# Patient Record
Sex: MALE | Race: WHITE | NOT HISPANIC OR LATINO | Employment: FULL TIME | URBAN - METROPOLITAN AREA
[De-identification: names, ages, dates, MRNs, and addresses within clinical notes are randomized per-mention and may not be internally consistent; named-entity substitution may affect disease eponyms.]

---

## 2022-11-17 ENCOUNTER — HOSPITAL ENCOUNTER (EMERGENCY)
Facility: HOSPITAL | Age: 42
Discharge: HOME | End: 2022-11-17
Attending: EMERGENCY MEDICINE | Admitting: EMERGENCY MEDICINE

## 2022-11-17 VITALS
RESPIRATION RATE: 18 BRPM | HEIGHT: 73 IN | OXYGEN SATURATION: 96 % | BODY MASS INDEX: 27.17 KG/M2 | DIASTOLIC BLOOD PRESSURE: 95 MMHG | WEIGHT: 205 LBS | TEMPERATURE: 98.1 F | HEART RATE: 84 BPM | SYSTOLIC BLOOD PRESSURE: 130 MMHG

## 2022-11-17 DIAGNOSIS — N36.8 IRRITATION OF URETHRA: Primary | ICD-10-CM

## 2022-11-17 LAB
BILIRUB UR QL STRIP: NEGATIVE
CLARITY UR: CLEAR
COLOR UR: YELLOW
GLUCOSE UR STRIP-MCNC: NEGATIVE MG/DL
HGB UR QL STRIP.AUTO: NEGATIVE
KETONES UR QL STRIP: NEGATIVE
LEUKOCYTE ESTERASE UR QL STRIP.AUTO: NEGATIVE
NITRITE UR QL STRIP: NEGATIVE
PH UR STRIP.AUTO: 6.5 [PH] (ref 4.5–8)
PROT UR QL STRIP: NEGATIVE
SP GR UR STRIP: 1.01 (ref 1–1.03)
UROBILINOGEN UR QL STRIP: NORMAL

## 2022-11-17 PROCEDURE — 81003 URINALYSIS AUTO W/O SCOPE: CPT | Performed by: EMERGENCY MEDICINE

## 2022-11-17 PROCEDURE — 99283 EMERGENCY DEPT VISIT LOW MDM: CPT

## 2022-11-17 RX ORDER — ONDANSETRON 4 MG/1
4 TABLET, ORALLY DISINTEGRATING ORAL EVERY 8 HOURS PRN
Qty: 21 TABLET | Refills: 0 | Status: SHIPPED | OUTPATIENT
Start: 2022-11-17

## 2022-11-17 RX ORDER — LEVOTHYROXINE SODIUM 0.05 MG/1
50 TABLET ORAL DAILY
COMMUNITY

## 2022-11-17 NOTE — ED PROVIDER NOTES
Subjective   History of Present Illness  41-year-old male presents with urethral irritation and burning.  Patient states his symptoms started 6 days ago and have been intermittent since that time.  Describes it as a burning sensation at his urethral meatus.  Denies trauma.  Denies bleeding or drainage.  Pain is not made worse with urination.  No urinary frequency or urgency.  Normal bowel movements.  No pain with bowel movements.  No pain elsewhere.  Patient has had occasional waves of nausea but no vomiting and has been eating and drinking normally.  Patient went to [place] emergency department for this complaint 4 days ago.  Reports that he had blood work, urinalysis, CT scan, and the only abnormality that was found was some bacteria in his urine.  He was prescribed 10-day course of Cipro and is on day 5 of this.  He took Pyridium for 2 days as well.  Patient denies concern for STI and states he is only sexually active with his wife.  He does not have any lesions to his genitalia.  Other than the antibiotic he has not taken any medications for his symptoms the past couple of days.  Denies weak stream or frequent urination at night.        Review of Systems   All other systems reviewed and are negative.      Past Medical History:   Diagnosis Date   • Disease of thyroid gland    • History of IBS        No Known Allergies    History reviewed. No pertinent surgical history.    History reviewed. No pertinent family history.    Social History     Socioeconomic History   • Marital status: Single   Tobacco Use   • Smoking status: Never   Substance and Sexual Activity   • Alcohol use: Yes     Comment: Socially   • Drug use: Never           Objective   Physical Exam  Constitutional:       General: He is not in acute distress.     Appearance: He is not toxic-appearing.   HENT:      Head: Normocephalic and atraumatic.   Eyes:      Extraocular Movements: Extraocular movements intact.      Pupils: Pupils are equal, round,  and reactive to light.   Cardiovascular:      Rate and Rhythm: Normal rate and regular rhythm.   Pulmonary:      Effort: Pulmonary effort is normal. No respiratory distress.   Musculoskeletal:         General: Normal range of motion.   Skin:     General: Skin is warm and dry.   Neurological:      General: No focal deficit present.      Mental Status: He is alert and oriented to person, place, and time. Mental status is at baseline.   Psychiatric:         Mood and Affect: Mood normal.         Behavior: Behavior normal.         Thought Content: Thought content normal.         Judgment: Judgment normal.         Procedures           ED Course  ED Course as of 11/17/22 0707   Thu Nov 17, 2022   0706 Patient overall nontoxic-appearing.  Vital signs are stable.  Urinalysis here is clean.  Ultimately I do not think further work-up is indicated here in the emergency department this morning.  I encourage patient to call primary care to see if they could access Man's urine culture results.  Also encouraged patient to finish his course of antibiotics and follow-up with urology.  Will prescribe short course of Zofran for patient's nausea although it sounds like he has history of IBS and this may not be necessarily related to his current complaint. [TD]      ED Course User Index  [TD] Ray Brar MD                                           [facility]    Final diagnoses:   Irritation of urethra       ED Disposition  ED Disposition     ED Disposition   Discharge    Condition   Stable    Comment   --             Alden Amin MD  [address] [facility] [address] [phone]    Call today      [facility]  [address] [facility] [id number] [phone]  Schedule an appointment as soon as possible for a visit in 1 week           Medication List      New Prescriptions    ondansetron ODT 4 MG disintegrating tablet  Commonly known as: ZOFRAN-ODT  Place 1 tablet on the tongue Every 8 (Eight) Hours  As Needed for Nausea or Vomiting.           Where to Get Your Medications      These medications were sent to [facility] [id number] - [place], [place] - [address]  & [facility] - [phone] [facility] [phone]   [address] [facility] [address]    Phone: [phone]   · ondansetron ODT 4 MG disintegrating tablet          Ray Brar MD  11/17/22 0707

## 2023-05-15 ENCOUNTER — APPOINTMENT (OUTPATIENT)
Dept: CARDIOLOGY | Facility: HOSPITAL | Age: 43
End: 2023-05-15
Payer: COMMERCIAL

## 2023-05-15 ENCOUNTER — APPOINTMENT (OUTPATIENT)
Dept: GENERAL RADIOLOGY | Facility: HOSPITAL | Age: 43
End: 2023-05-15
Payer: COMMERCIAL

## 2023-05-15 ENCOUNTER — HOSPITAL ENCOUNTER (EMERGENCY)
Facility: HOSPITAL | Age: 43
Discharge: HOME | End: 2023-05-15
Attending: EMERGENCY MEDICINE | Admitting: EMERGENCY MEDICINE
Payer: COMMERCIAL

## 2023-05-15 VITALS
HEIGHT: 72 IN | TEMPERATURE: 97.9 F | WEIGHT: 225 LBS | HEART RATE: 62 BPM | DIASTOLIC BLOOD PRESSURE: 86 MMHG | SYSTOLIC BLOOD PRESSURE: 147 MMHG | BODY MASS INDEX: 30.48 KG/M2 | OXYGEN SATURATION: 99 % | RESPIRATION RATE: 18 BRPM

## 2023-05-15 DIAGNOSIS — R00.2 HEART PALPITATIONS: Primary | ICD-10-CM

## 2023-05-15 DIAGNOSIS — I49.3 PVC'S (PREMATURE VENTRICULAR CONTRACTIONS): ICD-10-CM

## 2023-05-15 LAB
ALBUMIN SERPL-MCNC: 4.6 G/DL (ref 3.5–5.2)
ALBUMIN/GLOB SERPL: 1.5 G/DL
ALP SERPL-CCNC: 44 U/L (ref 39–117)
ALT SERPL W P-5'-P-CCNC: 29 U/L (ref 1–41)
ANION GAP SERPL CALCULATED.3IONS-SCNC: 9.2 MMOL/L (ref 5–15)
AST SERPL-CCNC: 23 U/L (ref 1–40)
BASOPHILS # BLD AUTO: 0.05 10*3/MM3 (ref 0–0.2)
BASOPHILS NFR BLD AUTO: 0.9 % (ref 0–1.5)
BILIRUB SERPL-MCNC: 0.7 MG/DL (ref 0–1.2)
BUN SERPL-MCNC: 18 MG/DL (ref 6–20)
BUN/CREAT SERPL: 18.9 (ref 7–25)
CALCIUM SPEC-SCNC: 9.5 MG/DL (ref 8.6–10.5)
CHLORIDE SERPL-SCNC: 106 MMOL/L (ref 98–107)
CO2 SERPL-SCNC: 23.8 MMOL/L (ref 22–29)
CREAT SERPL-MCNC: 0.95 MG/DL (ref 0.76–1.27)
DEPRECATED RDW RBC AUTO: 44 FL (ref 37–54)
EGFRCR SERPLBLD CKD-EPI 2021: 102.5 ML/MIN/1.73
EOSINOPHIL # BLD AUTO: 0.04 10*3/MM3 (ref 0–0.4)
EOSINOPHIL NFR BLD AUTO: 0.7 % (ref 0.3–6.2)
ERYTHROCYTE [DISTWIDTH] IN BLOOD BY AUTOMATED COUNT: 13.1 % (ref 12.3–15.4)
GLOBULIN UR ELPH-MCNC: 3 GM/DL
GLUCOSE SERPL-MCNC: 95 MG/DL (ref 65–99)
HCT VFR BLD AUTO: 44.1 % (ref 37.5–51)
HGB BLD-MCNC: 14.9 G/DL (ref 13–17.7)
HOLD SPECIMEN: NORMAL
HOLD SPECIMEN: NORMAL
IMM GRANULOCYTES # BLD AUTO: 0.01 10*3/MM3 (ref 0–0.05)
IMM GRANULOCYTES NFR BLD AUTO: 0.2 % (ref 0–0.5)
LIPASE SERPL-CCNC: 19 U/L (ref 13–60)
LYMPHOCYTES # BLD AUTO: 1.93 10*3/MM3 (ref 0.7–3.1)
LYMPHOCYTES NFR BLD AUTO: 35.4 % (ref 19.6–45.3)
MAGNESIUM SERPL-MCNC: 1.8 MG/DL (ref 1.6–2.6)
MCH RBC QN AUTO: 31.2 PG (ref 26.6–33)
MCHC RBC AUTO-ENTMCNC: 33.8 G/DL (ref 31.5–35.7)
MCV RBC AUTO: 92.3 FL (ref 79–97)
MONOCYTES # BLD AUTO: 0.47 10*3/MM3 (ref 0.1–0.9)
MONOCYTES NFR BLD AUTO: 8.6 % (ref 5–12)
NEUTROPHILS NFR BLD AUTO: 2.95 10*3/MM3 (ref 1.7–7)
NEUTROPHILS NFR BLD AUTO: 54.2 % (ref 42.7–76)
NRBC BLD AUTO-RTO: 0 /100 WBC (ref 0–0.2)
PLATELET # BLD AUTO: 192 10*3/MM3 (ref 140–450)
PMV BLD AUTO: 10.5 FL (ref 6–12)
POTASSIUM SERPL-SCNC: 4 MMOL/L (ref 3.5–5.2)
PROT SERPL-MCNC: 7.6 G/DL (ref 6–8.5)
QT INTERVAL: 389 MS
RBC # BLD AUTO: 4.78 10*6/MM3 (ref 4.14–5.8)
SODIUM SERPL-SCNC: 139 MMOL/L (ref 136–145)
T4 SERPL-MCNC: 8.03 MCG/DL (ref 4.5–11.7)
TROPONIN T SERPL HS-MCNC: <6 NG/L
TSH SERPL DL<=0.05 MIU/L-ACNC: 3.51 UIU/ML (ref 0.27–4.2)
WBC NRBC COR # BLD: 5.45 10*3/MM3 (ref 3.4–10.8)
WHOLE BLOOD HOLD COAG: NORMAL
WHOLE BLOOD HOLD SPECIMEN: NORMAL

## 2023-05-15 PROCEDURE — 83735 ASSAY OF MAGNESIUM: CPT | Performed by: EMERGENCY MEDICINE

## 2023-05-15 PROCEDURE — 84484 ASSAY OF TROPONIN QUANT: CPT | Performed by: EMERGENCY MEDICINE

## 2023-05-15 PROCEDURE — 93226 XTRNL ECG REC<48 HR SCAN A/R: CPT

## 2023-05-15 PROCEDURE — 93005 ELECTROCARDIOGRAM TRACING: CPT | Performed by: EMERGENCY MEDICINE

## 2023-05-15 PROCEDURE — 93225 XTRNL ECG REC<48 HRS REC: CPT

## 2023-05-15 PROCEDURE — 80050 GENERAL HEALTH PANEL: CPT | Performed by: EMERGENCY MEDICINE

## 2023-05-15 PROCEDURE — 99283 EMERGENCY DEPT VISIT LOW MDM: CPT

## 2023-05-15 PROCEDURE — 71046 X-RAY EXAM CHEST 2 VIEWS: CPT

## 2023-05-15 PROCEDURE — 36415 COLL VENOUS BLD VENIPUNCTURE: CPT

## 2023-05-15 PROCEDURE — 83690 ASSAY OF LIPASE: CPT | Performed by: EMERGENCY MEDICINE

## 2023-05-15 PROCEDURE — 84436 ASSAY OF TOTAL THYROXINE: CPT | Performed by: EMERGENCY MEDICINE

## 2023-05-15 PROCEDURE — 93005 ELECTROCARDIOGRAM TRACING: CPT

## 2023-05-15 RX ORDER — MULTIPLE VITAMINS W/ MINERALS TAB 9MG-400MCG
1 TAB ORAL DAILY
COMMUNITY

## 2023-05-15 RX ORDER — OMEPRAZOLE 20 MG/1
20 CAPSULE, DELAYED RELEASE ORAL DAILY
COMMUNITY

## 2023-05-15 RX ORDER — SODIUM CHLORIDE 0.9 % (FLUSH) 0.9 %
10 SYRINGE (ML) INJECTION AS NEEDED
Status: DISCONTINUED | OUTPATIENT
Start: 2023-05-15 | End: 2023-05-15 | Stop reason: HOSPADM

## 2023-05-15 NOTE — ED PROVIDER NOTES
"Subjective     History provided by:  Patient    History of Present Illness    · Chief complaint: Palpitations    · Location: Started in the epigastrium and radiate up substernally to the teeth and nose.    · Quality/Severity: The patient states he has sudden \"hard\" palpitations that started epigastric and radiates substernally up to his teeth and nose.  He states they come frequently and last a couple seconds at a time.    · Timing/Onset: He has had them for 20 years, but states for the last 10 days they become very frequent.    · Modifying Factors: He is unaware of any aggravating or relieving factors.    · Associated symptoms: Denies shortness of breath or diaphoresis.  Denies nausea or vomiting.    · Narrative: The patient is a 42-year-old white male who states that since he was diagnosed with a hiatal hernia 20 years ago he has had substernal palpitations from time to time.  The last 10 days he has had them very frequently.  He states they start in the epigastric area of the abdomen and radiates up substernally to his teeth and sometimes to his nose.  He states they pulsate very hard and last for couple seconds.  The patient has always thought they were due to his hiatal hernia.  He has no history of heart disease.  He has a history of chronic prostatitis and states that after that his blood pressure went up and his PCP prescribed him blood pressure medicine which he has not taken because since the prostatitis is gotten better his blood pressures been going down in the 130s over 70s to 80s.  He also has a history of hypothyroidism for which he takes levothyroxine.    Review of Systems  Past Medical History:   Diagnosis Date   • Disease of thyroid gland    • Hiatal hernia    • History of IBS      /86   Pulse 62   Temp 97.9 °F (36.6 °C) (Temporal)   Resp 18   Ht 182.9 cm (72\")   Wt 102 kg (225 lb)   SpO2 99%   BMI 30.52 kg/m²     Past Medical History:   Diagnosis Date   • Disease of thyroid gland    • " Hiatal hernia    • History of IBS        No Known Allergies    History reviewed. No pertinent surgical history.    History reviewed. No pertinent family history.    Social History     Socioeconomic History   • Marital status: Single   Tobacco Use   • Smoking status: Never   Substance and Sexual Activity   • Alcohol use: Yes     Comment: Socially   • Drug use: Never   • Sexual activity: Defer           Objective   Physical Exam  Vitals and nursing note reviewed.   Constitutional:       General: He is not in acute distress.     Appearance: Normal appearance. He is well-developed and normal weight. He is not ill-appearing, toxic-appearing or diaphoretic.      Comments: The patient appears healthy in no acute distress.  Review of his vital signs: His blood pressure was 134/81 when I was in the room, heart rate normal 64, respirations normal 18 with a normal room air oxygen saturation of 99%, afebrile with a temperature 97.9.   HENT:      Head: Normocephalic and atraumatic.      Nose: Nose normal.      Mouth/Throat:      Mouth: Mucous membranes are moist.      Pharynx: Oropharynx is clear.   Eyes:      General: No scleral icterus.        Right eye: No discharge.         Left eye: No discharge.      Pupils: Pupils are equal, round, and reactive to light.   Neck:      Thyroid: No thyromegaly.      Vascular: No JVD.   Cardiovascular:      Rate and Rhythm: Normal rate and regular rhythm.      Heart sounds: Normal heart sounds. No murmur heard.  Pulmonary:      Effort: Pulmonary effort is normal.      Breath sounds: Normal breath sounds. No wheezing, rhonchi or rales.   Chest:      Chest wall: No tenderness.   Abdominal:      General: Bowel sounds are normal. There is no distension.      Palpations: Abdomen is soft.      Tenderness: There is no abdominal tenderness.   Musculoskeletal:         General: No tenderness or deformity. Normal range of motion.      Cervical back: Normal range of motion and neck supple. No tenderness.       Right lower leg: No edema.      Left lower leg: No edema.   Lymphadenopathy:      Cervical: No cervical adenopathy.   Skin:     General: Skin is warm and dry.      Capillary Refill: Capillary refill takes less than 2 seconds.      Coloration: Skin is not pale.      Findings: No erythema or rash.   Neurological:      Mental Status: He is alert and oriented to person, place, and time.      Cranial Nerves: No cranial nerve deficit.      Coordination: Coordination normal.      Comments: No focal motor sensory deficit   Psychiatric:         Mood and Affect: Mood normal.         Behavior: Behavior normal.         Thought Content: Thought content normal.         Judgment: Judgment normal.         Procedures           ED Course  ED Course as of 05/15/23 1307   Mon May 15, 2023   1037 My interpretation of the patient's EKG tracing performed 10: 26 is normal sinus rhythm with a rate of 68, normal axis, normal conduction, no acute ST segment elevation or depression consistent with ischemia, normal PA and QT intervals, normal R wave transition.  Normal EKG.  No old tracings available for comparison. [TP]   1038 Review of the patient's laboratory studies: The patient's thyroid function test are within normal limits.  High sensitive opponent is negative.  Magnesium and lipase normal.  CMP and CBC are normal. [TP]   1144 The patient's chest x-ray is normal to mine and the radiologist interpretation. [TP]   1205 The patient had the palpitations while I was in the room and is due to a PVC. [TP]   1233 A 24-hour Holter monitor was placed on the patient by respiratory. [TP]   1233 The patient has a scheduled appointment with his PCP Dr. Amin for today which I encouraged him to keep.  I encouraged him to make an appointment to follow-up with Dr. Amin in a couple days to follow-up on the Holter monitor results.  Dr. Amin can decide if he wants to treat the PVCs with a beta-blocker, or the patient can make an appointment to follow-up  with cardiology. [TP]   0282 The patient was counseled to stop drinking caffeine. [TP]      ED Course User Index  [TP] Ray Padilla MD                                           Medical Decision Making  My differential diagnosis includes, but is not limited to: Benign palpitations, PACs, PVCs, bigeminy, paroxysmal SVT, atrial flutter, atrial fibrillation, ventricular tachycardia, multifocal atrial tachycardia.    Heart palpitations: acute illness or injury  PVC's (premature ventricular contractions): acute illness or injury  Amount and/or Complexity of Data Reviewed  Labs: ordered. Decision-making details documented in ED Course.  Radiology: ordered. Decision-making details documented in ED Course.  ECG/medicine tests: ordered and independent interpretation performed. Decision-making details documented in ED Course.      Risk  Prescription drug management.          Final diagnoses:   Heart palpitations   PVC's (premature ventricular contractions)       ED Disposition  ED Disposition     ED Disposition   Discharge    Condition   Stable    Comment   --             Alden Amin MD  [address]  [facility] [address]  [phone]    Go today  As scheduled    Ricky Townsend III, MD  [address] [facility] [address]  [facility] [id number] [phone]    Schedule an appointment as soon as possible for a visit   next available         Medication List      No changes were made to your prescriptions during this visit.         Labs Reviewed   LIPASE - Normal   SINGLE HSTROPONIN T - Normal    Narrative:     High Sensitive Troponin T Reference Range:  <10.0 ng/L- Negative Female for AMI  <15.0 ng/L- Negative Male for AMI  >=10 - Abnormal Female indicating possible myocardial injury.  >=15 - Abnormal Male indicating possible myocardial injury.   Clinicians would have to utilize clinical acumen, EKG, Troponin, and serial changes to determine if it is an Acute Myocardial Infarction or myocardial injury due to an underlying  chronic condition.        TSH - Normal   MAGNESIUM - Normal   CBC WITH AUTO DIFFERENTIAL - Normal   RAINBOW DRAW    Narrative:     The following orders were created for panel order [place] Draw.  Procedure                               Abnormality         Status                     ---------                               -----------         ------                     Green Top (Gel)[[id number]]                                  Final result               Lavender Top[[id number]]                                     Final result               Gold Top - SST[[id number]]                                   Final result               Light Blue Top[[id number]]                                   Final result                 Please view results for these tests on the individual orders.   COMPREHENSIVE METABOLIC PANEL    Narrative:     GFR Normal >60  Chronic Kidney Disease <60  Kidney Failure <15     T4   GREEN TOP   LAVENDER TOP   GOLD TOP - SST   LIGHT BLUE TOP   CBC AND DIFFERENTIAL    Narrative:     The following orders were created for panel order CBC & Differential.  Procedure                               Abnormality         Status                     ---------                               -----------         ------                     CBC Auto Differential[[id number]]        Normal              Final result                 Please view results for these tests on the individual orders.     XR Chest 2 View   Final Result   Negative chest.       This report was finalized on 5/15/2023 11:16 AM by Dr. Nathan Merchant MD.                 Medication List      No changes were made to your prescriptions during this visit.              Ray Padilla MD  05/15/23 6886

## 2023-05-15 NOTE — Clinical Note
[place]  [facility] EMERGENCY DEPARTMENT  [address]  [place] [address]  Phone: [phone]    Gt Diehl was seen and treated in our emergency department on 5/15/2023.  He may return to work on 05/15/2023.         Thank you for choosing [facility].    Ray Padilla MD

## 2023-05-15 NOTE — Clinical Note
[place]  [facility] EMERGENCY DEPARTMENT  [address]  [place] [address]  Phone: [phone]    Gt Diehl was seen and treated in our emergency department on 5/15/2023.  He may return to work on 05/16/2023.         Thank you for choosing [facility].    Ray Padilla MD

## 2023-05-17 ENCOUNTER — OFFICE VISIT (OUTPATIENT)
Dept: CARDIOLOGY | Facility: CLINIC | Age: 43
End: 2023-05-17
Payer: COMMERCIAL

## 2023-05-17 VITALS
HEIGHT: 72 IN | BODY MASS INDEX: 30.75 KG/M2 | RESPIRATION RATE: 16 BRPM | HEART RATE: 67 BPM | WEIGHT: 227 LBS | SYSTOLIC BLOOD PRESSURE: 150 MMHG | DIASTOLIC BLOOD PRESSURE: 100 MMHG

## 2023-05-17 DIAGNOSIS — I49.3 PVC (PREMATURE VENTRICULAR CONTRACTION): ICD-10-CM

## 2023-05-17 DIAGNOSIS — R07.2 PRECORDIAL PAIN: ICD-10-CM

## 2023-05-17 DIAGNOSIS — R00.2 PALPITATIONS: Primary | ICD-10-CM

## 2023-05-17 LAB
MAXIMAL PREDICTED HEART RATE: 178 BPM
STRESS TARGET HR: 151 BPM

## 2023-05-17 NOTE — PROGRESS NOTES
"      [facility]    Subjective:     Encounter Date:05/17/23      Patient ID: Gt Diehl is a 42 y.o. male.    Chief Complaint: No chief complaint on file.  Palpitations  History of Present Illness    Mr. Diehl is a pleasant 42-year-old gentleman past medical history hypertension, not on therapy, hiatal hernia, who presents for further evaluation of chest pain and palpitations.  He reports he has a history of PVCs that have been present for many years, but usually do not bother him.  He reports that over the past several months, he has noticed them more frequently, about 1 or 2 episodes a day where he can feel a sensation of a prominent heartbeat.  He denies any major discomfort, but he does have occurred more frequently.  A few days ago, he had an episode of chest pain.  He describes it as a substernal, burning-like pressure, slightly more intense than his prior hiatal hernia issues that he had in the past, it was happening near constantly, he presented the emergency department for further evaluation, and a high sensitive troponin was undetectable.  Work-up was benign.  He was having palpitations were worsening at that time, and a 24-hour Holter was fitted.  He had numerous episodes of palpitations that occurred while wearing the monitor, however tracings did not demonstrate significant arrhythmias to explain his symptoms, except for occasional PVCs.  He did go for a 5K run while wearing the monitor, and sinus tachycardia was observed without abnormalities.    He presents today for further evaluation.  He states that he has not really changed much of his diet, but does have a lot of supplements that he takes.  He takes preworkout supplements, arginine, as well as a \"vitality supplement\" that likely has stimulants in it.  He does drink coffee and uses caffeine quite a bit.  He has tried to cut it out ever since wearing the monitor, but he has not noticed much difference.    The following " "portions of the patient's history were reviewed and updated as appropriate: allergies, current medications, past family history, past medical history, past social history, past surgical history and problem list.    Past Medical History:   Diagnosis Date   • Disease of thyroid gland    • Hiatal hernia    • History of IBS        History reviewed. No pertinent surgical history.        ECG 12 Lead    Date/Time: 5/17/2023 4:15 PM  Performed by: Franky Davis MD  Authorized by: Franky Davis MD   Comparison: compared with previous ECG from 5/15/2023  Similar to previous ECG  Rhythm: sinus rhythm  Rate: normal  Conduction: conduction normal  ST Elevation: II  T Waves: T waves normal  QRS axis: normal  Other findings: early repolarization    Clinical impression: normal ECG               Objective:     Vitals:    05/17/23 1500   BP: 150/100   Pulse: 67   Resp: 16   Weight: 103 kg (227 lb)   Height: 182.9 cm (72\")         Constitutional:       Appearance: Healthy appearance. Not in distress.   Neck:      Vascular: JVD normal.   Pulmonary:      Effort: Pulmonary effort is normal.      Breath sounds: Normal breath sounds.   Cardiovascular:      PMI at left midclavicular line. Normal rate. Regular rhythm. Normal S2.      Murmurs: There is no murmur.   Pulses:     Intact distal pulses.   Edema:     Peripheral edema absent.   Skin:     General: Skin is warm and dry.   Neurological:      General: No focal deficit present.      Mental Status: Alert, oriented to person, place, and time and oriented to person, place and time.   Psychiatric:         Mood and Affect: Mood and affect normal.         Lab Review:       BUN   Date Value Ref Range Status   05/15/2023 18 6 - 20 mg/dL Final     Creatinine   Date Value Ref Range Status   05/15/2023 0.95 0.76 - 1.27 mg/dL Final     Potassium   Date Value Ref Range Status   05/15/2023 4.0 3.5 - 5.2 mmol/L Final     ALT (SGPT)   Date Value Ref Range Status   05/15/2023 29 1 - 41 U/L Final "     AST (SGOT)   Date Value Ref Range Status   05/15/2023 23 1 - 40 U/L Final     24-hour Holter monitor May 15, 2023:       Interpretation Summary       •  A normal monitor study.  •  There were 26 patient triggered events.  Of those, the majority of fluttering events correlate with sinus rhythm.  Only a few correlated with a single PVC.  There were no abnormal heart rhythms detected to explain patient's symptoms.  Sinus tachycardia with rate of 150 bpm was observed during the monitoring, per journal this correlates with a 5K run.  Normal monitor.      Performed        Assessment:          Diagnosis Plan   1. Palpitations  Adult Transthoracic Echo Complete w/ Color, Spectral and Contrast if necessary per protocol    Treadmill Stress Test      2. PVC (premature ventricular contraction)  Adult Transthoracic Echo Complete w/ Color, Spectral and Contrast if necessary per protocol    Treadmill Stress Test      3. Precordial pain  Adult Transthoracic Echo Complete w/ Color, Spectral and Contrast if necessary per protocol    Treadmill Stress Test             Plan:         1. Palpitations with chest pain: I reviewed the Holter with patient directly.  There were some episodes of the palpitations that occurred in the setting PVCs, but the vast majority correlated with normal rhythm.  It is possible that some of the simulates that the patient is taking including the supplements are contributing to increased nerve perceptions of his heartbeats which are in fact normal.  There are some PVCs to blame, but his total burden is 0.5% and this is not abnormal.  I reassured patient that these likely constitute normal findings  1. We will arrange for a treadmill stress test to ensure no exercise-induced arrhythmias, however the patient did run with the monitor on and I saw no rhythm issues  2. We will check echocardiogram  3. Largely reassured patient that his findings are normal and his heart is functioning normally, obtaining these  above test.  4. If tests are unremarkable, I do not think pharmacologic therapy is needed, I think patient should start to cut back on some of his supplements and find out which one is triggering some of the increased nerve perceptions irregular heartbeats.  Would probably start with his stamina supplement.  2. Hypertension: Would recommend coming off of some of his stimulants and supplements per above.  If his heart rate and blood pressure remain elevated on this, then I would recommend him start the lisinopril which he picked up from his PCP but never started later last year    Thank you for allowing me to participate in the care of Gt Diehl. Feel free to contact me directly with any further questions or concerns.    RTC as needed.  We will obtain a triple stress test and echocardiogram to ensure no significant maladies noted, and if unremarkable patient reassured that his findings are normal and his heart is functioning normally.    Franky Davis MD  [facility]  05/17/23  16:12 EDT       Current Outpatient Medications:   •  levothyroxine (SYNTHROID, LEVOTHROID) 50 MCG tablet, Take 1 tablet by mouth Daily., Disp: , Rfl:   •  multivitamin with minerals tablet tablet, Take 1 tablet by mouth Daily., Disp: , Rfl:   •  omeprazole (priLOSEC) 20 MG capsule, Take 1 capsule by mouth Daily., Disp: , Rfl:   •  Probiotic Product (PRO-BIOTIC BLEND PO), Take  by mouth., Disp: , Rfl:          Return if symptoms worsen or fail to improve.      Part of this note may be an electronic transcription/translation of spoken language to printed text using the Dragon Dictation System.

## 2023-06-14 ENCOUNTER — HOSPITAL ENCOUNTER (OUTPATIENT)
Dept: CARDIOLOGY | Facility: HOSPITAL | Age: 43
Discharge: HOME | End: 2023-06-14
Admitting: STUDENT IN AN ORGANIZED HEALTH CARE EDUCATION/TRAINING PROGRAM
Payer: COMMERCIAL

## 2023-06-14 DIAGNOSIS — I49.3 PVC (PREMATURE VENTRICULAR CONTRACTION): ICD-10-CM

## 2023-06-14 DIAGNOSIS — R07.2 PRECORDIAL PAIN: ICD-10-CM

## 2023-06-14 DIAGNOSIS — R00.2 PALPITATIONS: ICD-10-CM

## 2023-06-14 LAB
BH CV STRESS BP STAGE 1: NORMAL
BH CV STRESS BP STAGE 2: NORMAL
BH CV STRESS BP STAGE 3: NORMAL
BH CV STRESS BP STAGE 4: NORMAL
BH CV STRESS DURATION MIN STAGE 1: 3
BH CV STRESS DURATION MIN STAGE 2: 3
BH CV STRESS DURATION MIN STAGE 3: 3
BH CV STRESS DURATION SEC STAGE 1: 0
BH CV STRESS DURATION SEC STAGE 2: 0
BH CV STRESS DURATION SEC STAGE 3: 0
BH CV STRESS DURATION SEC STAGE 4: 58
BH CV STRESS GRADE STAGE 1: 10
BH CV STRESS GRADE STAGE 2: 12
BH CV STRESS GRADE STAGE 3: 14
BH CV STRESS GRADE STAGE 4: 16
BH CV STRESS HR STAGE 1: 91
BH CV STRESS HR STAGE 2: 109
BH CV STRESS HR STAGE 3: 146
BH CV STRESS HR STAGE 4: 158
BH CV STRESS METS STAGE 1: 4.6
BH CV STRESS METS STAGE 2: 7.1
BH CV STRESS METS STAGE 3: 10.2
BH CV STRESS METS STAGE 4: 11.7
BH CV STRESS PROTOCOL 1: NORMAL
BH CV STRESS RECOVERY BP: NORMAL MMHG
BH CV STRESS RECOVERY HR: 82 BPM
BH CV STRESS SPEED STAGE 1: 1.7
BH CV STRESS SPEED STAGE 2: 2.5
BH CV STRESS SPEED STAGE 3: 3.4
BH CV STRESS SPEED STAGE 4: 4.2
BH CV STRESS STAGE 1: 1
BH CV STRESS STAGE 2: 2
BH CV STRESS STAGE 3: 3
BH CV STRESS STAGE 4: 4
MAXIMAL PREDICTED HEART RATE: 178 BPM
PERCENT MAX PREDICTED HR: 88.76 %
STRESS BASELINE BP: NORMAL MMHG
STRESS BASELINE HR: 72 BPM
STRESS O2 SAT REST: 97 %
STRESS PERCENT HR: 104 %
STRESS POST ESTIMATED WORKLOAD: 11.7 METS
STRESS POST EXERCISE DUR MIN: 9 MIN
STRESS POST EXERCISE DUR SEC: 59 SEC
STRESS POST O2 SAT PEAK: 97 %
STRESS POST PEAK BP: NORMAL MMHG
STRESS POST PEAK HR: 158 BPM
STRESS TARGET HR: 151 BPM

## 2023-06-14 PROCEDURE — 93017 CV STRESS TEST TRACING ONLY: CPT

## 2023-06-14 NOTE — PROGRESS NOTES
Mr. Diehl, your stress test was completely normal.  There were no abnormal findings on this and this is low risk for any heart complications.  There were no significant heart rhythm disturbances either, and all this is very reassuring.  We will further touch base after your heart ultrasound results.

## 2023-06-23 ENCOUNTER — TELEPHONE (OUTPATIENT)
Dept: CARDIOLOGY | Facility: CLINIC | Age: 43
End: 2023-06-23

## 2023-06-23 NOTE — TELEPHONE ENCOUNTER
Caller: Gt Diehl    Relationship: Self    Best call back number: [phone]    What is the best time to reach you: ANY    Who are you requesting to speak with (clinical staff, provider,  specific staff member): ANY        What was the call regarding: PATIENT STATES HE HAS SOME LIFESTYLE AND MEDICATION QUESTIONS IN REGARDS TO THE ECHO TEST THAT HE HAD TAKEN. PLEASE CALL PATIENT IN REGARDS TO THESE ISSUES.    Is it okay if the provider responds through MyChart: PLEASE CALL.

## 2023-08-24 ENCOUNTER — HOSPITAL ENCOUNTER (EMERGENCY)
Facility: HOSPITAL | Age: 43
Discharge: HOME | End: 2023-08-24
Attending: EMERGENCY MEDICINE
Payer: COMMERCIAL

## 2023-08-24 VITALS
TEMPERATURE: 98 F | OXYGEN SATURATION: 99 % | DIASTOLIC BLOOD PRESSURE: 86 MMHG | SYSTOLIC BLOOD PRESSURE: 140 MMHG | BODY MASS INDEX: 29.16 KG/M2 | HEIGHT: 73 IN | RESPIRATION RATE: 14 BRPM | HEART RATE: 72 BPM | WEIGHT: 220 LBS

## 2023-08-24 DIAGNOSIS — R30.0 DYSURIA: ICD-10-CM

## 2023-08-24 DIAGNOSIS — K58.0 IRRITABLE BOWEL SYNDROME WITH DIARRHEA: Primary | ICD-10-CM

## 2023-08-24 LAB
BILIRUB UR QL STRIP: NEGATIVE
CLARITY UR: CLEAR
COLOR UR: ABNORMAL
GLUCOSE UR STRIP-MCNC: NEGATIVE MG/DL
HGB UR QL STRIP.AUTO: NEGATIVE
KETONES UR QL STRIP: NEGATIVE
LEUKOCYTE ESTERASE UR QL STRIP.AUTO: NEGATIVE
NITRITE UR QL STRIP: NEGATIVE
PH UR STRIP.AUTO: 5.5 [PH] (ref 4.5–8)
PROT UR QL STRIP: NEGATIVE
SP GR UR STRIP: 1.02 (ref 1–1.03)
UROBILINOGEN UR QL STRIP: ABNORMAL

## 2023-08-24 PROCEDURE — 87661 TRICHOMONAS VAGINALIS AMPLIF: CPT | Performed by: EMERGENCY MEDICINE

## 2023-08-24 PROCEDURE — 99282 EMERGENCY DEPT VISIT SF MDM: CPT

## 2023-08-24 PROCEDURE — 81003 URINALYSIS AUTO W/O SCOPE: CPT | Performed by: EMERGENCY MEDICINE

## 2023-08-24 PROCEDURE — 87491 CHLMYD TRACH DNA AMP PROBE: CPT | Performed by: EMERGENCY MEDICINE

## 2023-08-24 PROCEDURE — 87591 N.GONORRHOEAE DNA AMP PROB: CPT | Performed by: EMERGENCY MEDICINE

## 2023-08-24 RX ORDER — DICYCLOMINE HCL 20 MG
TABLET ORAL
Qty: 24 TABLET | Refills: 1 | Status: SHIPPED | OUTPATIENT
Start: 2023-08-24

## 2023-08-24 RX ORDER — ALFUZOSIN HYDROCHLORIDE 10 MG/1
10 TABLET, EXTENDED RELEASE ORAL DAILY
COMMUNITY

## 2023-08-24 NOTE — Clinical Note
[place]  [facility]  [address] [place] [address]  Phone: [phone]    Gt Diehl was seen and treated in our emergency department on 8/24/2023.  He may return to work on 08/24/2023.         Thank you for choosing [facility].    Ray Pdailla MD

## 2023-08-26 LAB
C TRACH RRNA SPEC QL NAA+PROBE: NEGATIVE
N GONORRHOEA RRNA SPEC QL NAA+PROBE: NEGATIVE
T VAGINALIS RRNA SPEC QL NAA+PROBE: NEGATIVE

## 2023-08-31 ENCOUNTER — TELEPHONE (OUTPATIENT)
Dept: CARDIOLOGY | Facility: CLINIC | Age: 43
End: 2023-08-31
Payer: COMMERCIAL

## 2023-08-31 DIAGNOSIS — R00.2 PALPITATIONS: Primary | ICD-10-CM

## 2023-08-31 DIAGNOSIS — I42.8 NICM (NONISCHEMIC CARDIOMYOPATHY): ICD-10-CM

## 2023-08-31 RX ORDER — CARVEDILOL 6.25 MG/1
6.25 TABLET ORAL 2 TIMES DAILY WITH MEALS
Qty: 60 TABLET | Refills: 11 | Status: SHIPPED | OUTPATIENT
Start: 2023-08-31 | End: 2024-08-30

## 2023-08-31 NOTE — TELEPHONE ENCOUNTER
Caller: Gt Diehl    Relationship: Self    Best call back number: [phone]    What medications are you currently taking:   Current Outpatient Medications on File Prior to Visit   Medication Sig Dispense Refill    alfuzosin (UROXATRAL) 10 MG 24 hr tablet Take 1 tablet by mouth Daily.      carvedilol (COREG) 6.25 MG tablet Take 1 tablet by mouth 2 (Two) Times a Day With Meals. 60 tablet 11    dicyclomine (BENTYL) 20 MG tablet 1 tablet p.o. every 6 hours as needed abdominal pain. 24 tablet 1    levothyroxine (SYNTHROID, LEVOTHROID) 50 MCG tablet Take 1 tablet by mouth Daily.      multivitamin with minerals tablet tablet Take 1 tablet by mouth Daily.      omeprazole (priLOSEC) 20 MG capsule Take 1 capsule by mouth Daily.      Probiotic Product (PRO-BIOTIC BLEND PO) Take  by mouth.       No current facility-administered medications on file prior to visit.          When did you start taking these medications: JUNE    Which medication are you concerned about: CARVEDILOL    Who prescribed you this medication: DR. DONAHUE    What are your concerns: PATIENT HAS HAD COUPLE MILD EPISODES OF PALPITATION AND AN INCREASED EPISODE ON SATURDAY.   PATIENT STATES HE DOES FEEL BETTER TAKING THE CARVEDILOL, BUT WONDERS IF THE DOSAGE NEEDS INCREASED.    How long have you had these concerns: THE PAST WEEK.

## 2023-08-31 NOTE — TELEPHONE ENCOUNTER
I called the patient to discuss his symptoms.  He states over the weekend he had a few episodes of increasing palpitations.  Previous to this last 3 months his symptoms have been well controlled.  I instructed him to take an additional dose of carvedilol as needed.  If he starts doing this on a daily basis we can increase his medication to 12.5 mg in the morning and 6.25 mg at night.  Additionally encouraged him to make sure he is staying hydrated and keeping up with his electrolytes.

## 2024-01-05 ENCOUNTER — CLINICAL SUPPORT (OUTPATIENT)
Dept: CARDIOLOGY | Facility: CLINIC | Age: 44
End: 2024-01-05
Payer: COMMERCIAL

## 2024-01-05 NOTE — PROGRESS NOTES
Pt came into after pcp follow up yesterday with blood pressure in the 160's/97   Pt also stated was given lisinopril last year but never took any. Is this something he should maybe consider or not.  Also stated has random light dizzy spells couple times weekly just faint to mild light headedness.  Couple days last week missed carvedilol in the morning but for the most part consistently taking.    BP: 128/86  P:68  02:98

## 2024-01-16 ENCOUNTER — OFFICE VISIT (OUTPATIENT)
Dept: CARDIOLOGY | Facility: CLINIC | Age: 44
End: 2024-01-16
Payer: COMMERCIAL

## 2024-01-16 VITALS
HEIGHT: 73 IN | HEART RATE: 68 BPM | WEIGHT: 233.58 LBS | BODY MASS INDEX: 30.96 KG/M2 | SYSTOLIC BLOOD PRESSURE: 140 MMHG | OXYGEN SATURATION: 97 % | DIASTOLIC BLOOD PRESSURE: 80 MMHG

## 2024-01-16 DIAGNOSIS — I42.8 NICM (NONISCHEMIC CARDIOMYOPATHY): ICD-10-CM

## 2024-01-16 DIAGNOSIS — R00.2 PALPITATIONS: ICD-10-CM

## 2024-01-16 DIAGNOSIS — U07.1 MYOCARDITIS DUE TO COVID-19 VIRUS: Primary | ICD-10-CM

## 2024-01-16 DIAGNOSIS — I40.0 MYOCARDITIS DUE TO COVID-19 VIRUS: Primary | ICD-10-CM

## 2024-01-16 PROCEDURE — 99214 OFFICE O/P EST MOD 30 MIN: CPT | Performed by: STUDENT IN AN ORGANIZED HEALTH CARE EDUCATION/TRAINING PROGRAM

## 2024-01-16 RX ORDER — CARVEDILOL 12.5 MG/1
12.5 TABLET ORAL 2 TIMES DAILY WITH MEALS
Qty: 60 TABLET | Refills: 11 | Status: SHIPPED | OUTPATIENT
Start: 2024-01-16 | End: 2025-01-15

## 2024-01-16 NOTE — PATIENT INSTRUCTIONS
I would recommend you increase the carvedilol medication to 12.5mg twice daily. This should help the heart stay strong. We will get the heart ultrasound to recheck your heart in 6 months

## 2024-01-16 NOTE — PROGRESS NOTES
[facility]    Subjective:     Encounter Date:01/16/24      Patient ID: Gt Diehl is a 43 y.o. male.    Chief Complaint: No chief complaint on file.  Palpitations  History of Present Illness    Mr. Diehl is a pleasant 42-year-old gentleman past medical history hypertension, not on therapy, hiatal hernia, who presents for further evaluation of chest pain and palpitations.  He reports he has a history of PVCs that have been present for many years, but usually do not bother him.  He reports that over the past several months, he has noticed them more frequently, about 1 or 2 episodes a day where he can feel a sensation of a prominent heartbeat.  He denies any major discomfort, but he does have occurred more frequently.  A few days ago, he had an episode of chest pain.  He describes it as a substernal, burning-like pressure, slightly more intense than his prior hiatal hernia issues that he had in the past, it was happening near constantly, he presented the emergency department for further evaluation, and a high sensitive troponin was undetectable.  Work-up was benign.  He was having palpitations were worsening at that time, and a 24-hour Holter was fitted.  He had numerous episodes of palpitations that occurred while wearing the monitor, however tracings did not demonstrate significant arrhythmias to explain his symptoms, except for occasional PVCs.  He did go for a 5K run while wearing the monitor, and sinus tachycardia was observed without abnormalities.    Since his last visit, he has done better after taking carvedilol.  He is having some labile blood pressure issues.  He underwent an echocardiogram which revealed a mildly reduced EF, and he was started on carvedilol due to this.  Subsequently underwent a CMR which revealed no evidence of LGE and a low normal EF of 51%.  His symptoms were thought related to a significant COVID-19 infection with probable myocarditis that occurred in 2021  with residual symptoms since that time.    Previous cardiac testing:  Treadmill stress test June 2023 DTS +10    No ECG evidence of myocardial ischemia.    Negative clinical evidence of myocardial ischemia.    Findings consistent with a normal ECG stress test.    Echocardiogram June 2023:    Left ventricular systolic function is mildly decreased. Left ventricular ejection fraction appears to be 46 - 50%. Abnormal global longitudinal LV strain (GLS) = -15.1%. Left ventricle strain data was not reviewed by the physician. Normal left ventricular cavity size and wall thickness noted. All left ventricular wall segments contract normally. Left ventricular diastolic function was normal.    The pulmonic valve is not well-visualized. The peak velocity of 2.2 m/s (maximum and mean pressure gradient of 19 and 10 mmHg, respectively) is suggestive of mild pulmonary stenosis. However, nonvisualization of the valve means that it cannot be determined if it is valvular, subvalvular, or supravalvular.    Trace to mild mitral valve regurgitation is present. No significant mitral valve stenosis is present. There is very mild myxomatous change of the mitral valve.    Cardiac MRIJuly 7, 2023  1. The left ventricle is borderline dilated.  There is low normal systolic function with an ejection fraction of 51%.  2. The right ventricle is mildly dilated with normal systolic function.  Ejection fraction 54%.  3.  Normal perfusion of the myocardium.  4.  There is no delayed enhancement.         The following portions of the patient's history were reviewed and updated as appropriate: allergies, current medications, past family history, past medical history, past social history, past surgical history and problem list.    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 No pertinent surgical  "history.      Procedures       Objective:     Vitals:    01/16/24 1458   BP: 140/80   BP Location: Left arm   Pulse: 68   SpO2: 97%   Weight: 106 kg (233 lb 9.3 oz)   Height: 185.4 cm (73\")         Constitutional:       Appearance: Healthy appearance. Not in distress.   Neck:      Vascular: JVD normal.   Pulmonary:      Effort: Pulmonary effort is normal.      Breath sounds: Normal breath sounds.   Cardiovascular:      PMI at left midclavicular line. Normal rate. Regular rhythm. Normal S2.       Murmurs: There is no murmur.   Pulses:     Intact distal pulses.   Edema:     Peripheral edema absent.   Skin:     General: Skin is warm and dry.   Neurological:      General: No focal deficit present.      Mental Status: Alert, oriented to person, place, and time and oriented to person, place and time.   Psychiatric:         Mood and Affect: Mood and affect normal.         Lab Review:       BUN   Date Value Ref Range Status   05/15/2023 18 6 - 20 mg/dL Final     Creatinine   Date Value Ref Range Status   07/07/2023 0.80 0.60 - 1.30 mg/dL Final     Comment:     Serial Number: [id number]:  [id number]     Potassium   Date Value Ref Range Status   05/15/2023 4.0 3.5 - 5.2 mmol/L Final     ALT (SGPT)   Date Value Ref Range Status   05/15/2023 29 1 - 41 U/L Final     AST (SGOT)   Date Value Ref Range Status   05/15/2023 23 1 - 40 U/L Final     24-hour Holter monitor May 15, 2023:       Interpretation Summary         A normal monitor study.    There were 26 patient triggered events.  Of those, the majority of fluttering events correlate with sinus rhythm.  Only a few correlated with a single PVC.  There were no abnormal heart rhythms detected to explain patient's symptoms.  Sinus tachycardia with rate of 150 bpm was observed during the monitoring, per journal this correlates with a 5K run.  Normal monitor.      Performed        Assessment:          Diagnosis Plan   1. 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 Palpitations  carvedilol (COREG) 12.5 MG tablet    Holter Monitor - 72 Hour Up To 15 Days    Adult Transthoracic Echo Complete w/ Color, Spectral and Contrast if necessary per protocol      3. NICM (nonischemic cardiomyopathy)                 Plan:         Palpitations with chest pain: Improved with carvedilol   There were no arrhythmia correlate on a 24-hour Holter, but he continues have these intermittent episodes.    I do suspect that the cardiomyopathy he had a was related to a prior residual COVID-19 related myocarditis episode.    Given this, we will repeat a 10-day Holter to look for any significant arrhythmias   Increase carvedilol to 12.5 twice daily for blood pressure and heart rate   Cardiomyopathy, nonischemic  EF 45%, poss related to prior myocarditis episode in 2021, suspected clinically  CMR without evidence of LGE 2023  Carpal stress test without ischemia  Continue carvedilol per above  We will repeat echo in 6 months for 1 year follow-up echo after beta-blocker up titration.  NYHA class I  Possible pulmonic stenosis  CMR did not reveal any significant supper supra pulmonic stenosis.  There is a mild increase gradient across the trileaflet pulmonic valve, but no evidence of significant pathology   Hypertension: Carvedilol per above.  I do not feel the need for lisinopril right now      Thank you for allowing me to participate in the care of Gt Diehl. Feel free to contact me directly with any further questions or concerns.    RTC as needed.  We will obtain a triple stress test and echocardiogram to ensure no significant maladies noted, and if unremarkable patient reassured that his findings are normal and his heart is functioning normally.    Franky Davis MD  [facility]  01/16/24  16:12 EDT       Current Outpatient Medications:     alfuzosin (UROXATRAL) 10 MG 24 hr tablet, Take 1 tablet by mouth Daily.,  Disp: , Rfl:     carvedilol (COREG) 12.5 MG tablet, Take 1 tablet by mouth 2 (Two) Times a Day With Meals. Able to take extra one time does per day if worsening palpitations, Disp: 60 tablet, Rfl: 11    dicyclomine (BENTYL) 20 MG tablet, 1 tablet p.o. every 6 hours as needed abdominal pain., Disp: 24 tablet, Rfl: 1    levothyroxine (SYNTHROID, LEVOTHROID) 50 MCG tablet, Take 1 tablet by mouth Daily., Disp: , Rfl:     multivitamin with minerals tablet tablet, Take 1 tablet by mouth Daily., Disp: , Rfl:     omeprazole (priLOSEC) 20 MG capsule, Take 1 capsule by mouth Daily., Disp: , Rfl:     Probiotic Product (PRO-BIOTIC BLEND PO), Take  by mouth., Disp: , Rfl:          Return in about 6 months (around 7/16/2024).      Part of this note may be an electronic transcription/translation of spoken language to printed text using the Dragon Dictation System.

## 2024-04-29 ENCOUNTER — TELEPHONE (OUTPATIENT)
Dept: CARDIOLOGY | Facility: CLINIC | Age: 44
End: 2024-04-29

## 2024-04-29 DIAGNOSIS — R00.2 PALPITATIONS: ICD-10-CM

## 2024-04-29 RX ORDER — CARVEDILOL 6.25 MG/1
6.25 TABLET ORAL 2 TIMES DAILY WITH MEALS
Qty: 60 TABLET | Refills: 11 | Status: SHIPPED | OUTPATIENT
Start: 2024-04-29 | End: 2025-04-29

## 2024-04-29 NOTE — TELEPHONE ENCOUNTER
"Please call let patient know I sent the new adjusted dose of the 6.25 mg taken twice per day to his pharmacy.  Night sweats are not typically a symptom of carvedilol.  In fact, carvedilol sometimes treats \"hyperhidrosis\" or excessive sweating.  I would recommend patient follow-up with his PCP regarding these complaints.  We can certainly cut back the dose, any events in the future, of carvedilol depending on what our testing shows.  We will follow-up after his echo is done in July."

## 2024-04-29 NOTE — TELEPHONE ENCOUNTER
"I spoke with pt who shares that several months ago when his carvedilol was doubled from 6.25 mg BID to 12.5 mg BID, he began having the fogginess, fatigue, feeling dazed, and having the night sweats.  Pt says the dose was \"debilitating\".  Due to his 6 job changes and busy personal life, he didn't have a chance to call our office to let us know he wasn't tolerating that dose.  Shortly after the change, pt started to cut up his 12.5 mg tablets in half to go back to his prior dose.  He recognizes that he's not been table to take a consistent 6.25 mg BID due to not being able to cut the pill evenly in half.  This has been going on for months.    Back in March when he was last checking his BP, it was running 120-130s/80s, HR 60-70s.  I did confirm his pharmacy is accurate.    Do you have any recommendations for this patient?    Thank you,    Sharee BURNS RN  [facility]  04/29/24  14:23 EDT    "

## 2024-04-29 NOTE — TELEPHONE ENCOUNTER
Caller: Gt Diehl    Relationship: Self    Best call back number: [phone]     Which medication are you concerned about: CARVEDILOL    Who prescribed you this medication: ISABELA    What are your concerns: PATIENT IS NOT DOING WELL ON THE CARVEDILOL 12.5 MG. HE HAS FATIGUE, FOGGINESS, NIGHT SWEATS, AND FEELS LIKE HE IS IN A DAZE. HE HAS TRIED CUTTING THE TABLETS IN HALF, BUT NEEDS ADVISEMENT ON HOW TO PROCEED OR IF THE MEDICATION NEEDS TO BE ALTERED.

## 2024-04-29 NOTE — TELEPHONE ENCOUNTER
I tried to call Gt Diehl but there was no answer.  Pt identified themselves on mailbox so I left a detailed message relaying information from provider regarding medication instructions.  Pt had verbalized it was okay to leave a detailed VM for him in our prior conversation.  I encouraged pt to call our office back if they have any further questions.    Thank you,    Sharee BURNS RN  [facility] [place]  04/29/24 16:02 EDT

## 2024-07-02 ENCOUNTER — TELEPHONE (OUTPATIENT)
Dept: CARDIOLOGY | Facility: CLINIC | Age: 44
End: 2024-07-02

## 2024-07-02 NOTE — TELEPHONE ENCOUNTER
Caller: Gt Diehl    Relationship: Self    Best call back number: [phone]    What is the best time to reach you: ANYTIME    Who are you requesting to speak with (clinical staff, provider,  specific staff member): CLINICAL        What was the call regarding: PT MISSED CALL TO [facility] DOANH[facility] APPT AND ECHO IN [place].  UNABLE TO WT  PLEASE CALL PT BACK TO MICHELLE

## 2024-07-18 ENCOUNTER — OFFICE VISIT (OUTPATIENT)
Dept: CARDIOLOGY | Facility: CLINIC | Age: 44
End: 2024-07-18
Payer: COMMERCIAL

## 2024-07-18 ENCOUNTER — HOSPITAL ENCOUNTER (OUTPATIENT)
Dept: CARDIOLOGY | Facility: HOSPITAL | Age: 44
Discharge: HOME | End: 2024-07-18
Admitting: STUDENT IN AN ORGANIZED HEALTH CARE EDUCATION/TRAINING PROGRAM
Payer: COMMERCIAL

## 2024-07-18 VITALS
BODY MASS INDEX: 31.65 KG/M2 | HEIGHT: 72 IN | DIASTOLIC BLOOD PRESSURE: 78 MMHG | SYSTOLIC BLOOD PRESSURE: 139 MMHG | WEIGHT: 233.69 LBS | HEART RATE: 65 BPM

## 2024-07-18 VITALS
HEART RATE: 56 BPM | DIASTOLIC BLOOD PRESSURE: 80 MMHG | HEIGHT: 72 IN | SYSTOLIC BLOOD PRESSURE: 138 MMHG | WEIGHT: 219.5 LBS | BODY MASS INDEX: 29.73 KG/M2

## 2024-07-18 DIAGNOSIS — R00.2 PALPITATIONS: ICD-10-CM

## 2024-07-18 DIAGNOSIS — U07.1 MYOCARDITIS DUE TO COVID-19 VIRUS: ICD-10-CM

## 2024-07-18 DIAGNOSIS — I40.0 MYOCARDITIS DUE TO COVID-19 VIRUS: ICD-10-CM

## 2024-07-18 DIAGNOSIS — I42.8 NICM (NONISCHEMIC CARDIOMYOPATHY): ICD-10-CM

## 2024-07-18 DIAGNOSIS — R00.2 PALPITATIONS: Primary | ICD-10-CM

## 2024-07-18 LAB
AORTIC DIMENSIONLESS INDEX: 0.9 (DI)
BH CV ECHO LEFT VENTRICLE GLOBAL LONGITUDINAL STRAIN: -18.7 %
BH CV ECHO MEAS - AO MAX PG: 4.8 MMHG
BH CV ECHO MEAS - AO MEAN PG: 3 MMHG
BH CV ECHO MEAS - AO V2 MAX: 109 CM/SEC
BH CV ECHO MEAS - AO V2 VTI: 24.6 CM
BH CV ECHO MEAS - AVA(I,D): 3.9 CM2
BH CV ECHO MEAS - EDV(CUBED): 125 ML
BH CV ECHO MEAS - EDV(MOD-SP2): 169 ML
BH CV ECHO MEAS - EDV(MOD-SP4): 147 ML
BH CV ECHO MEAS - EF(MOD-BP): 42.2 %
BH CV ECHO MEAS - EF(MOD-SP2): 47.3 %
BH CV ECHO MEAS - EF(MOD-SP4): 40.1 %
BH CV ECHO MEAS - ESV(CUBED): 59.6 ML
BH CV ECHO MEAS - ESV(MOD-SP2): 89 ML
BH CV ECHO MEAS - ESV(MOD-SP4): 88 ML
BH CV ECHO MEAS - FS: 21.9 %
BH CV ECHO MEAS - IVS/LVPW: 1 CM
BH CV ECHO MEAS - IVSD: 0.9 CM
BH CV ECHO MEAS - LAT PEAK E' VEL: 11.2 CM/SEC
BH CV ECHO MEAS - LV DIASTOLIC VOL/BSA (35-75): 64.3 CM2
BH CV ECHO MEAS - LV MASS(C)D: 158.2 GRAMS
BH CV ECHO MEAS - LV MAX PG: 5 MMHG
BH CV ECHO MEAS - LV MEAN PG: 3 MMHG
BH CV ECHO MEAS - LV SYSTOLIC VOL/BSA (12-30): 38.5 CM2
BH CV ECHO MEAS - LV V1 MAX: 112 CM/SEC
BH CV ECHO MEAS - LV V1 VTI: 22.1 CM
BH CV ECHO MEAS - LVIDD: 5 CM
BH CV ECHO MEAS - LVIDS: 3.9 CM
BH CV ECHO MEAS - LVOT AREA: 4.4 CM2
BH CV ECHO MEAS - LVOT DIAM: 2.36 CM
BH CV ECHO MEAS - LVPWD: 0.9 CM
BH CV ECHO MEAS - MED PEAK E' VEL: 7.4 CM/SEC
BH CV ECHO MEAS - MV A MAX VEL: 72.8 CM/SEC
BH CV ECHO MEAS - MV DEC SLOPE: 420.6 CM/SEC2
BH CV ECHO MEAS - MV DEC TIME: 0.21 SEC
BH CV ECHO MEAS - MV E MAX VEL: 81.2 CM/SEC
BH CV ECHO MEAS - MV E/A: 1.12
BH CV ECHO MEAS - MV MAX PG: 2.7 MMHG
BH CV ECHO MEAS - MV MEAN PG: 0.96 MMHG
BH CV ECHO MEAS - MV P1/2T: 63.1 MSEC
BH CV ECHO MEAS - MV V2 VTI: 22.5 CM
BH CV ECHO MEAS - MVA(P1/2T): 3.5 CM2
BH CV ECHO MEAS - MVA(VTI): 4.3 CM2
BH CV ECHO MEAS - PA ACC TIME: 0.17 SEC
BH CV ECHO MEAS - PA V2 MAX: 217 CM/SEC
BH CV ECHO MEAS - QP/QS: 0.85
BH CV ECHO MEAS - RAP SYSTOLE: 3 MMHG
BH CV ECHO MEAS - RV MAX PG: 2 MMHG
BH CV ECHO MEAS - RV V1 MAX: 70.7 CM/SEC
BH CV ECHO MEAS - RV V1 VTI: 16.8 CM
BH CV ECHO MEAS - RVOT DIAM: 2.49 CM
BH CV ECHO MEAS - SV(LVOT): 96.7 ML
BH CV ECHO MEAS - SV(MOD-SP2): 80 ML
BH CV ECHO MEAS - SV(MOD-SP4): 59 ML
BH CV ECHO MEAS - SV(RVOT): 81.9 ML
BH CV ECHO MEAS - SVI(LVOT): 42.3 ML/M2
BH CV ECHO MEAS - SVI(MOD-SP2): 35 ML/M2
BH CV ECHO MEAS - SVI(MOD-SP4): 25.8 ML/M2
BH CV ECHO MEASUREMENTS AVERAGE E/E' RATIO: 8.73
BH CV XLRA - RV BASE: 4 CM
BH CV XLRA - RV LENGTH: 8.8 CM
BH CV XLRA - RV MID: 3.1 CM
BH CV XLRA - TDI S': 12.4 CM/SEC
LEFT ATRIUM VOLUME INDEX: 20.8 ML/M2
LV EF 2D ECHO EST: 50 %
SINUS: 3 CM

## 2024-07-18 PROCEDURE — 93306 TTE W/DOPPLER COMPLETE: CPT

## 2024-07-18 PROCEDURE — 25510000001 PERFLUTREN (DEFINITY) 8.476 MG IN SODIUM CHLORIDE (PF) 0.9 % 10 ML INJECTION: Performed by: STUDENT IN AN ORGANIZED HEALTH CARE EDUCATION/TRAINING PROGRAM

## 2024-07-18 PROCEDURE — 93356 MYOCRD STRAIN IMG SPCKL TRCK: CPT

## 2024-07-18 RX ADMIN — SODIUM CHLORIDE 2 ML: 9 INJECTION INTRAMUSCULAR; INTRAVENOUS; SUBCUTANEOUS at 09:03

## 2024-07-18 NOTE — PROGRESS NOTES
"    CARDIOLOGY        Patient Name: Gt Diehl  :1980  Age: 43 y.o.  Primary Cardiologist: Franky Davis MD  Encounter Provider:  Zana Rendon PA-C    Date of Service: 24            CHIEF COMPLAINT / REASON FOR OFFICE VISIT     6 month Follow-up      HISTORY OF PRESENT ILLNESS       HPI  Gt Diehl is a 43 y.o. male who presents today for 6 month follow-up.     Pt has a  history significant for NICM, palpitations, and myocarditis due to covid presents for 6 month follow up. Pt was seen in office on 24 for follow up. He was doing better at that time taking his medications.     Patient has been feeling better in regards of his palpitations after continuing his carvedilol.  Patient was given lisinopril 10 mg for elevated blood pressure.  He has not started this.  Patient has not had any new chest complaints.  Patient did have brief episode of leg swelling.  Uricto quite has since resolved.  Patient does have some shortness of breath when walking upstairs.  He does treadmill at 2% incline without any issues.    The following portions of the patient's history were reviewed and updated as appropriate: allergies, current medications, past family history, past medical history, past social history, past surgical history and problem list.      VITAL SIGNS     Visit Vitals  /80 (BP Location: Left arm)   Pulse 56   Ht 184 cm (72.44\")   Wt 99.6 kg (219 lb 8 oz)   BMI 29.41 kg/m²       @[email or web address]  Wt Readings from Last 3 Encounters:   24 99.6 kg (219 lb 8 oz)   24 106 kg (233 lb 11 oz)   24 106 kg (233 lb 9.3 oz)     Body mass index is 29.41 kg/m².        PHYSICAL EXAMINATION     Constitutional:       General: Awake. Not in acute distress.     Appearance: Not in distress.   Pulmonary:      Effort: Pulmonary effort is normal.      Breath sounds: Normal breath sounds.   Cardiovascular:      Normal rate. Regular rhythm.      Murmurs: There is no murmur.   Skin:    "  General: Skin is warm.   Neurological:      Mental Status: Alert.   Psychiatric:         Behavior: Behavior is cooperative.           REVIEWED DATA       ECG 12 Lead    Date/Time: 7/18/2024 9:40 AM  Performed by: Zana Rendon PA-C    Authorized by: Zana Rendon PA-C  Comparison: compared with previous ECG   Similar to previous ECG  Rhythm: sinus rhythm  Rate: bradycardic  BPM: 56  ST Elevation: II  QRS axis: normal  Other findings: early repolarization    Clinical impression: non-specific ECG  Comments: To previous EKG on 5/17/2023          Cardiac Procedures:       Transthoracic echo from today  Interpretation Summary      Left ventricular systolic function is normal. Estimated left ventricular EF = 50%    Left ventricular diastolic function was normal.    Peak velocity of the flow distal to the aortic valve is 109 cm/s. Aortic valve maximum pressure gradient is 5 mmHg. Aortic valve mean pressure gradient is 3 mmHg. Aortic valve dimensionless index is 0.9 .         Cardiac MRIJuly 7, 2023  1. The left ventricle is borderline dilated.  There is low normal systolic function with an ejection fraction of 51%.  2. The right ventricle is mildly dilated with normal systolic function.  Ejection fraction 54%.  3.  Normal perfusion of the myocardium.  4.  There is no delayed enhancement.      Transthoracic echo on 6/22/2023  Interpretation Summary      Left ventricular systolic function is mildly decreased. Left ventricular ejection fraction appears to be 46 - 50%. Abnormal global longitudinal LV strain (GLS) = -15.1%. Left ventricle strain data was not reviewed by the physician. Normal left ventricular cavity size and wall thickness noted. All left ventricular wall segments contract normally. Left ventricular diastolic function was normal.    The pulmonic valve is not well-visualized. The peak velocity of 2.2 m/s (maximum and mean pressure gradient of 19 and 10 mmHg, respectively) is suggestive of mild  "pulmonary stenosis. However, nonvisualization of the valve means that it cannot be determined if it is valvular, subvalvular, or supravalvular.    Trace to mild mitral valve regurgitation is present. No significant mitral valve stenosis is present. There is very mild myxomatous change of the mitral valve.      Treadmill stress test on 6/14/2023  Interpretation Summary       No ECG evidence of myocardial ischemia.    Negative clinical evidence of myocardial ischemia.    Findings consistent with a normal ECG stress test.       Lab Results   Component Value Date     05/15/2023    K 4.0 05/15/2023     05/15/2023    CO2 23.8 05/15/2023    BUN 18 05/15/2023    CREATININE 0.80 07/07/2023    CREATININE 0.95 05/15/2023    GLUCOSE 95 05/15/2023    CALCIUM 9.5 05/15/2023    ALBUMIN 4.6 05/15/2023    BILITOT 0.7 05/15/2023    AST 23 05/15/2023    ALT 29 05/15/2023     Lab Results   Component Value Date    WBC 5.45 05/15/2023    HGB 14.9 05/15/2023    HCT 44.1 05/15/2023    MCV 92.3 05/15/2023     05/15/2023     No results found for: \"PROBNP\", \"BNP\"  Lab Results   Component Value Date    TROPONINT <6 05/15/2023     Lab Results   Component Value Date    TSH 3.510 05/15/2023             ASSESSMENT & PLAN     Diagnoses and all orders for this visit:      Palpitations with chest pain: Improved with carvedilol   There were no arrhythmia correlate on a 24-hour Holter, but he continues have these intermittent episodes.    Suspect that the cardiomyopathy he had a was related to a prior residual COVID-19 related myocarditis episode.    Given this, we will repeat a 10-day Holter to look for any significant arrhythmias   Increase carvedilol to 12.5 twice daily for blood pressure and heart rate     Cardiomyopathy, nonischemic  EF 45%, poss related to prior myocarditis episode in 2021, suspected clinically  CMR without evidence of LGE 2023  Stress test without ischemia  Continue carvedilol per above  I reviewed echo from " today with a EF of 50% LV function normal    Possible pulmonic stenosis  CMR did not reveal any significant supper supra pulmonic stenosis.  There is a mild increase gradient across the trileaflet pulmonic valve, but no evidence of significant pathology     Hypertension: Carvedilol per above.  Due to elevated blood pressure hide to patient to go ahead and start his 10 mg of lisinopril.         Return in about 6 months (around 1/18/2025) for Dr. Davis.    Future Appointments         Provider Department Center    1/22/2025 9:30 AM Franky Davis MD [facility] CARDIOLOGY [facility]                MEDICATIONS         Discharge Medications            Accurate as of July 18, 2024  1:15 PM. If you have any questions, ask your nurse or doctor.                Continue These Medications        Instructions Start Date   alfuzosin 10 MG 24 hr tablet  Commonly known as: UROXATRAL   10 mg, Oral, Daily      carvedilol 6.25 MG tablet  Commonly known as: COREG   6.25 mg, Oral, 2 Times Daily With Meals, Able to take extra one time does per day if worsening palpitations      levothyroxine 50 MCG tablet  Commonly known as: SYNTHROID, LEVOTHROID   50 mcg, Oral, Daily      multivitamin with minerals tablet tablet   1 tablet, Oral, Daily      PRO-BIOTIC BLEND PO   Oral                   **Dragon Disclaimer:   Much of this encounter note is an electronic transcription/translation of spoken language to printed text. The electronic translation of spoken language may permit erroneous, or at times, nonsensical words or phrases to be inadvertently transcribed. Although I have reviewed the note for such errors, some may still exist.

## 2024-07-18 NOTE — PROGRESS NOTES
FYI, echo test shows a stable findings.  Maybe a bit improved compared to previous.  Overall, no new or worsening findings.

## 2024-12-28 PROBLEM — R11.0 NAUSEA: Status: ACTIVE | Noted: 2024-12-28

## 2024-12-28 PROBLEM — Z20.2 EXPOSURE TO TRICHOMONAS: Status: ACTIVE | Noted: 2024-12-28

## 2024-12-28 PROBLEM — R35.0 URINARY FREQUENCY: Status: ACTIVE | Noted: 2024-12-28

## 2024-12-28 PROBLEM — R19.7 DIARRHEA: Status: ACTIVE | Noted: 2024-12-28

## 2025-01-21 ENCOUNTER — TELEPHONE (OUTPATIENT)
Dept: CARDIOLOGY | Facility: CLINIC | Age: 45
End: 2025-01-21

## 2025-01-21 NOTE — TELEPHONE ENCOUNTER
Caller: Gt Diehl    Relationship: Self    Best call back number: [phone]      What is the best time to reach you: ANYTIME    Who are you requesting to speak with (clinical staff, provider,  specific staff member): CLINICAL    Do you know the name of the person who called: N/A    What was the call regarding: PATIENT HAS A QUESTION HE WOULD LIKE TO SPEAK WITH DR. DONAHUE ABOUT. PLEASE REACH OUT.  PATIENT WANTS TO KNOW HIS EJECTION FRACTION IN FIRST ECHO SHOWED 46-52 PERCENT SECOND ONE SHOWED ON LY 50 PERCENT. PATIENT WANTS TO KNOW WHAT THAT MEANS. IS 50 PERCENT GOOD OR BAD OR WHAT DOES THAT MEAN    Is it okay if the provider responds through [employer]:  IF NO ANSWER OK TO SEND RESPONSE THROUGH [employer]

## 2025-02-19 ENCOUNTER — APPOINTMENT (OUTPATIENT)
Dept: CT IMAGING | Facility: HOSPITAL | Age: 45
End: 2025-02-19
Payer: COMMERCIAL

## 2025-02-19 ENCOUNTER — HOSPITAL ENCOUNTER (EMERGENCY)
Facility: HOSPITAL | Age: 45
Discharge: HOME | End: 2025-02-19
Attending: STUDENT IN AN ORGANIZED HEALTH CARE EDUCATION/TRAINING PROGRAM | Admitting: STUDENT IN AN ORGANIZED HEALTH CARE EDUCATION/TRAINING PROGRAM
Payer: COMMERCIAL

## 2025-02-19 VITALS
RESPIRATION RATE: 18 BRPM | TEMPERATURE: 97.8 F | SYSTOLIC BLOOD PRESSURE: 148 MMHG | OXYGEN SATURATION: 97 % | HEART RATE: 86 BPM | DIASTOLIC BLOOD PRESSURE: 106 MMHG

## 2025-02-19 DIAGNOSIS — R10.84 GENERALIZED ABDOMINAL PAIN: Primary | ICD-10-CM

## 2025-02-19 LAB
ALBUMIN SERPL-MCNC: 4.2 G/DL (ref 3.5–5.2)
ALBUMIN/GLOB SERPL: 1.4 G/DL
ALP SERPL-CCNC: 44 U/L (ref 39–117)
ALT SERPL W P-5'-P-CCNC: 86 U/L (ref 1–41)
AMYLASE SERPL-CCNC: 167 U/L (ref 28–100)
ANION GAP SERPL CALCULATED.3IONS-SCNC: 10.8 MMOL/L (ref 5–15)
AST SERPL-CCNC: 46 U/L (ref 1–40)
BASOPHILS # BLD AUTO: 0.04 10*3/MM3 (ref 0–0.2)
BASOPHILS NFR BLD AUTO: 0.9 % (ref 0–1.5)
BILIRUB SERPL-MCNC: 0.9 MG/DL (ref 0–1.2)
BUN SERPL-MCNC: 15 MG/DL (ref 6–20)
BUN/CREAT SERPL: 16.5 (ref 7–25)
CALCIUM SPEC-SCNC: 9.1 MG/DL (ref 8.6–10.5)
CHLORIDE SERPL-SCNC: 104 MMOL/L (ref 98–107)
CO2 SERPL-SCNC: 23.2 MMOL/L (ref 22–29)
CREAT SERPL-MCNC: 0.91 MG/DL (ref 0.76–1.27)
DEPRECATED RDW RBC AUTO: 41.6 FL (ref 37–54)
EGFRCR SERPLBLD CKD-EPI 2021: 106.6 ML/MIN/1.73
EOSINOPHIL # BLD AUTO: 0.08 10*3/MM3 (ref 0–0.4)
EOSINOPHIL NFR BLD AUTO: 1.9 % (ref 0.3–6.2)
ERYTHROCYTE [DISTWIDTH] IN BLOOD BY AUTOMATED COUNT: 12.9 % (ref 12.3–15.4)
GLOBULIN UR ELPH-MCNC: 3 GM/DL
GLUCOSE SERPL-MCNC: 100 MG/DL (ref 65–99)
HCT VFR BLD AUTO: 44.1 % (ref 37.5–51)
HGB BLD-MCNC: 15.7 G/DL (ref 13–17.7)
IMM GRANULOCYTES # BLD AUTO: 0.01 10*3/MM3 (ref 0–0.05)
IMM GRANULOCYTES NFR BLD AUTO: 0.2 % (ref 0–0.5)
LIPASE SERPL-CCNC: 25 U/L (ref 13–60)
LYMPHOCYTES # BLD AUTO: 1.19 10*3/MM3 (ref 0.7–3.1)
LYMPHOCYTES NFR BLD AUTO: 27.7 % (ref 19.6–45.3)
MCH RBC QN AUTO: 31.6 PG (ref 26.6–33)
MCHC RBC AUTO-ENTMCNC: 35.6 G/DL (ref 31.5–35.7)
MCV RBC AUTO: 88.7 FL (ref 79–97)
MONOCYTES # BLD AUTO: 0.39 10*3/MM3 (ref 0.1–0.9)
MONOCYTES NFR BLD AUTO: 9.1 % (ref 5–12)
NEUTROPHILS NFR BLD AUTO: 2.58 10*3/MM3 (ref 1.7–7)
NEUTROPHILS NFR BLD AUTO: 60.2 % (ref 42.7–76)
NRBC BLD AUTO-RTO: 0 /100 WBC (ref 0–0.2)
PLATELET # BLD AUTO: 174 10*3/MM3 (ref 140–450)
PMV BLD AUTO: 10.5 FL (ref 6–12)
POTASSIUM SERPL-SCNC: 4 MMOL/L (ref 3.5–5.2)
PROT SERPL-MCNC: 7.2 G/DL (ref 6–8.5)
RBC # BLD AUTO: 4.97 10*6/MM3 (ref 4.14–5.8)
SODIUM SERPL-SCNC: 138 MMOL/L (ref 136–145)
WBC NRBC COR # BLD AUTO: 4.29 10*3/MM3 (ref 3.4–10.8)

## 2025-02-19 PROCEDURE — 96372 THER/PROPH/DIAG INJ SC/IM: CPT

## 2025-02-19 PROCEDURE — 25010000002 DICYCLOMINE PER 20 MG: Performed by: STUDENT IN AN ORGANIZED HEALTH CARE EDUCATION/TRAINING PROGRAM

## 2025-02-19 PROCEDURE — 25510000001 IOPAMIDOL PER 1 ML: Performed by: STUDENT IN AN ORGANIZED HEALTH CARE EDUCATION/TRAINING PROGRAM

## 2025-02-19 PROCEDURE — 82150 ASSAY OF AMYLASE: CPT | Performed by: STUDENT IN AN ORGANIZED HEALTH CARE EDUCATION/TRAINING PROGRAM

## 2025-02-19 PROCEDURE — 99285 EMERGENCY DEPT VISIT HI MDM: CPT | Performed by: STUDENT IN AN ORGANIZED HEALTH CARE EDUCATION/TRAINING PROGRAM

## 2025-02-19 PROCEDURE — 83690 ASSAY OF LIPASE: CPT | Performed by: STUDENT IN AN ORGANIZED HEALTH CARE EDUCATION/TRAINING PROGRAM

## 2025-02-19 PROCEDURE — 25010000002 ONDANSETRON PER 1 MG: Performed by: STUDENT IN AN ORGANIZED HEALTH CARE EDUCATION/TRAINING PROGRAM

## 2025-02-19 PROCEDURE — 74177 CT ABD & PELVIS W/CONTRAST: CPT

## 2025-02-19 PROCEDURE — 85025 COMPLETE CBC W/AUTO DIFF WBC: CPT | Performed by: STUDENT IN AN ORGANIZED HEALTH CARE EDUCATION/TRAINING PROGRAM

## 2025-02-19 PROCEDURE — 80053 COMPREHEN METABOLIC PANEL: CPT | Performed by: STUDENT IN AN ORGANIZED HEALTH CARE EDUCATION/TRAINING PROGRAM

## 2025-02-19 PROCEDURE — 96374 THER/PROPH/DIAG INJ IV PUSH: CPT

## 2025-02-19 RX ORDER — ONDANSETRON 4 MG/1
4 TABLET, ORALLY DISINTEGRATING ORAL EVERY 8 HOURS PRN
Qty: 15 TABLET | Refills: 0 | Status: SHIPPED | OUTPATIENT
Start: 2025-02-19 | End: 2025-02-24

## 2025-02-19 RX ORDER — IOPAMIDOL 755 MG/ML
100 INJECTION, SOLUTION INTRAVASCULAR
Status: COMPLETED | OUTPATIENT
Start: 2025-02-19 | End: 2025-02-19

## 2025-02-19 RX ORDER — DICYCLOMINE HCL 20 MG
20 TABLET ORAL EVERY 6 HOURS PRN
Qty: 28 TABLET | Refills: 0 | Status: SHIPPED | OUTPATIENT
Start: 2025-02-19 | End: 2025-02-26

## 2025-02-19 RX ORDER — ONDANSETRON 2 MG/ML
4 INJECTION INTRAMUSCULAR; INTRAVENOUS ONCE
Status: COMPLETED | OUTPATIENT
Start: 2025-02-19 | End: 2025-02-19

## 2025-02-19 RX ORDER — DICYCLOMINE HYDROCHLORIDE 10 MG/ML
20 INJECTION INTRAMUSCULAR ONCE
Status: COMPLETED | OUTPATIENT
Start: 2025-02-19 | End: 2025-02-19

## 2025-02-19 RX ADMIN — ONDANSETRON 4 MG: 2 INJECTION INTRAMUSCULAR; INTRAVENOUS at 07:33

## 2025-02-19 RX ADMIN — DICYCLOMINE HYDROCHLORIDE 20 MG: 10 INJECTION, SOLUTION INTRAMUSCULAR at 07:34

## 2025-02-19 RX ADMIN — IOPAMIDOL 100 ML: 755 INJECTION, SOLUTION INTRAVENOUS at 09:27

## 2025-02-19 NOTE — Clinical Note
[facility] EMERGENCY DEPARTMENT  [address]  [place] [address]  Phone: [phone]    Gt Diehl was seen and treated in our emergency department on 2/19/2025.  He may return to work on 02/20/2025.         Thank you for choosing [facility].    Augustus Ramirez MD

## 2025-02-19 NOTE — ED PROVIDER NOTES
"Subjective   History of Present Illness  Pt is a 44 y.o. male with PMH as listed who presents for   Chief Complaint   Patient presents with    Abdominal Pain     Pt reports black stool over last 2 months. Lab work 2 days ago with elevated liver enzymes.        Patient is a 44-year-old male presents for nausea and abdominal pain that has been going on for months.  He is seen his PCP and had lab work performed, amylase was elevated at 177 lipase however was normal and liver function test were mostly unremarkable other than ALT being minimally elevated from recent lab work.  He was told by his PCP that if the pain persist to present to the ED for further evaluation and so that is why he presented here today although this again has been going on for weeks to months.  The nausea is been going on since October \"relentlessly\".  And the abdominal pain has been fairly constant for the past 7 weeks or so.  No specific exacerbating relieving factors.  He does state that he has had 2 episodes of hematochezia over the past several weeks to months, last time was several days ago.  He has had normal bowel movements since that time however with no hematochezia or melena present.  No other new complaints at this time.    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       No past surgical history on file.    No family history on file.    Social History     Socioeconomic History    Marital status: Single   Tobacco Use    Smoking status: Never    Smokeless tobacco: Never   Vaping Use    Vaping status: Never Used   Substance and Sexual Activity    Alcohol use: Not Currently    Drug use: Never    Sexual activity: Yes     Partners: Female     Comment: most recent " "contact approx 1 month ago           Objective   Physical Exam  Constitutional:       Appearance: Normal appearance.   HENT:      Head: Normocephalic and atraumatic.      Mouth/Throat:      Mouth: Mucous membranes are moist.      Pharynx: Oropharynx is clear.   Eyes:      Conjunctiva/sclera: Conjunctivae normal.   Cardiovascular:      Rate and Rhythm: Normal rate and regular rhythm.   Pulmonary:      Effort: Pulmonary effort is normal.      Breath sounds: Normal breath sounds.   Abdominal:      General: Abdomen is flat.      Palpations: Abdomen is soft.      Tenderness: There is generalized abdominal tenderness. There is no guarding or rebound. Negative signs include Szymanski's sign and McBurney's sign.   Genitourinary:     Rectum: Normal. Guaiac result negative. No tenderness.   Musculoskeletal:      Cervical back: Neck supple.   Skin:     General: Skin is warm and dry.   Neurological:      Mental Status: He is alert.   Psychiatric:         Mood and Affect: Mood normal.         Procedures           ED Course  ED Course as of 02/19/25 0943   Wed Feb 19, 2025   0715 Patient is a 44-year-old male presents for nausea and abdominal pain that has been going on for months.  He is seen his PCP and had lab work performed, amylase was elevated at 177 lipase however was normal and liver function test were mostly unremarkable other than ALT being minimally elevated from recent lab work.  He was told by his PCP that if the pain persist to present to the ED for further evaluation and so that is why he presented here today although this again has been going on for weeks to months.  The nausea is been going on since October \"relentlessly\".  And the abdominal pain has been fairly constant for the past 7 weeks or so.  No specific exacerbating relieving factors.  He does state that he has had 2 episodes of hematochezia over the past several weeks to months, last time was several days ago.  He has had normal bowel movements since that " time however with no hematochezia or melena present.  No other new complaints at this time. [JF]   0942 Lab work is the patient's baseline with amylase slightly less than it was when he was seen at PCPs office and ALT around baseline, AST minimally elevated.  White count within normal is lipase the normal limits.  CT and pelvis negative for any acute findings.  Discussed patient plan for discharge with PCP and GI follow-up and prescription for Bentyl and Zofran sent to patient's pharmacy.  Patient understands and agrees.  All questions answered. [JF]      ED Course User Index  [JF] Augustus Ramirez MD                                                       Medical Decision Making  My differential diagnosis for abdominal pain includes but is not limited to:  Gastritis, gastroenteritis, peptic ulcer disease, GERD, esophageal perforation, acute appendicitis, mesenteric adenitis, Meckel's diverticulum, epiploic appendagitis, diverticulitis, colon cancer, ulcerative colitis, Crohn's disease, intussusception, small bowel obstruction, adhesions, ischemic bowel, perforated viscus, ileus, obstipation, biliary colic, cholecystitis, cholelithiasis, Joel-Brown [place], hepatitis, pancreatitis, common bile duct obstruction, cholangitis, bile leak, splenic trauma, splenic rupture, splenic infarction, splenic abscess, abdominal abscess, ascites, spontaneous bacterial peritonitis, hernia, UTI, cystitis, prostatitis, ureterolithiasis, urinary obstruction, AAA, myocardial infarction, pneumonia, cancer, porphyria, DKA, medications, sickle cell, viral syndrome, zoster        Problems Addressed:  Generalized abdominal pain: complicated acute illness or injury    Amount and/or Complexity of Data Reviewed  Labs: ordered. Decision-making details documented in ED Course.  Radiology: ordered. Decision-making details documented in ED Course.    Risk  Prescription drug management.        Final diagnoses:   Generalized abdominal pain       ED  Disposition  ED Disposition       ED Disposition   Discharge    Condition   Stable    Comment   --               Alden Amin MD  [address]  [facility] [address]  [phone]    Schedule an appointment as soon as possible for a visit in 2 days  For re-evaluation    Reyes Guzman MD  [address] [facility]  [address]  [place] [id number] [phone]    Call in 1 day  to establish care, For re-evaluation         Medication List        New Prescriptions      dicyclomine 20 MG tablet  Commonly known as: BENTYL  Take 1 tablet by mouth Every 6 (Six) Hours As Needed for Abdominal Cramping for up to 7 days.            Changed      * ondansetron ODT 4 MG disintegrating tablet  Commonly known as: ZOFRAN-ODT  Place 1 tablet on the tongue Every 8 (Eight) Hours As Needed for Nausea or Vomiting.  What changed: Another medication with the same name was added. Make sure you understand how and when to take each.     * ondansetron ODT 4 MG disintegrating tablet  Commonly known as: ZOFRAN-ODT  Place 1 tablet on the tongue Every 8 (Eight) Hours As Needed for Nausea or Vomiting for up to 5 days.  What changed: You were already taking a medication with the same name, and this prescription was added. Make sure you understand how and when to take each.           * This list has 2 medication(s) that are the same as other medications prescribed for you. Read the directions carefully, and ask your doctor or other care provider to review them with you.                   Where to Get Your Medications        These medications were sent to [facility] [id number] - [place], [place] - [address] [facility] [address] [facility]  & [address] [phone] [facility] [phone]   [address] [facility] [address]      Phone: [phone]   dicyclomine 20 MG tablet  ondansetron ODT 4 MG disintegrating tablet            Augustus Ramirez MD  02/19/25 0916

## 2025-02-27 ENCOUNTER — PREP FOR SURGERY (OUTPATIENT)
Dept: SURGERY | Facility: SURGERY CENTER | Age: 45
End: 2025-02-27
Payer: COMMERCIAL

## 2025-02-27 ENCOUNTER — LAB (OUTPATIENT)
Dept: LAB | Facility: HOSPITAL | Age: 45
End: 2025-02-27
Payer: COMMERCIAL

## 2025-02-27 ENCOUNTER — OFFICE VISIT (OUTPATIENT)
Dept: GASTROENTEROLOGY | Facility: CLINIC | Age: 45
End: 2025-02-27
Payer: COMMERCIAL

## 2025-02-27 VITALS
SYSTOLIC BLOOD PRESSURE: 126 MMHG | WEIGHT: 230.4 LBS | BODY MASS INDEX: 29.57 KG/M2 | HEIGHT: 74 IN | DIASTOLIC BLOOD PRESSURE: 88 MMHG

## 2025-02-27 DIAGNOSIS — R11.0 NAUSEA: ICD-10-CM

## 2025-02-27 DIAGNOSIS — R10.9 ABDOMINAL PAIN, UNSPECIFIED ABDOMINAL LOCATION: ICD-10-CM

## 2025-02-27 DIAGNOSIS — K59.04 CHRONIC IDIOPATHIC CONSTIPATION: ICD-10-CM

## 2025-02-27 DIAGNOSIS — K62.5 RECTAL BLEEDING: Primary | ICD-10-CM

## 2025-02-27 DIAGNOSIS — R74.8 ELEVATED LIVER ENZYMES: ICD-10-CM

## 2025-02-27 PROBLEM — Z12.11 ENCOUNTER FOR SCREENING COLONOSCOPY: Status: ACTIVE | Noted: 2025-02-27

## 2025-02-27 LAB
ALBUMIN SERPL-MCNC: 4.3 G/DL (ref 3.5–5.2)
ALP SERPL-CCNC: 41 U/L (ref 39–117)
ALT SERPL W P-5'-P-CCNC: 35 U/L (ref 1–41)
AST SERPL-CCNC: 27 U/L (ref 1–40)
BILIRUB CONJ SERPL-MCNC: 0.2 MG/DL (ref 0–0.3)
BILIRUB INDIRECT SERPL-MCNC: 0.5 MG/DL
BILIRUB SERPL-MCNC: 0.7 MG/DL (ref 0–1.2)
PROT SERPL-MCNC: 7.6 G/DL (ref 6–8.5)

## 2025-02-27 PROCEDURE — 36415 COLL VENOUS BLD VENIPUNCTURE: CPT

## 2025-02-27 PROCEDURE — 80076 HEPATIC FUNCTION PANEL: CPT

## 2025-02-27 RX ORDER — SODIUM CHLORIDE 0.9 % (FLUSH) 0.9 %
10 SYRINGE (ML) INJECTION AS NEEDED
OUTPATIENT
Start: 2025-02-27

## 2025-02-27 RX ORDER — SODIUM CHLORIDE, SODIUM LACTATE, POTASSIUM CHLORIDE, CALCIUM CHLORIDE 600; 310; 30; 20 MG/100ML; MG/100ML; MG/100ML; MG/100ML
30 INJECTION, SOLUTION INTRAVENOUS CONTINUOUS PRN
OUTPATIENT
Start: 2025-02-27 | End: 2025-02-28

## 2025-02-27 RX ORDER — SODIUM CHLORIDE 0.9 % (FLUSH) 0.9 %
3 SYRINGE (ML) INJECTION EVERY 12 HOURS SCHEDULED
OUTPATIENT
Start: 2025-02-27

## 2025-02-27 NOTE — PATIENT INSTRUCTIONS
Schedule EGD and colonoscopy, orders placed     Ways to help reduce heartburn symptoms:    Avoid eating late night snacks within 3 hours of going to bed  If you have increased abdominal body weight, lifestyle changes to improve weight can help with heartburn symtoms  If you use tobacco products, we recommend that you stop smoking including e-cigarettes  Research has proven that people with heartburn who use tobacco can reduce heartburn symptoms by 44% after they stop smoking.   Sleep on your left side  Sleeping with your head/upper body elevated with a wedge pillow or with the head of the bed inclined   Avoid foods that can trigger symptoms which may include:  citrus fruits  spicy foods,  Tomatoes and Red sauces   Chocolate  coffee/tea  caffeinated or carbonated beverages  Alcohol  High fat foods  peppermint    We also recommend considering over-the-counter liquid Gaviscon per package instructions to take as needed for heartburn symptoms.    For nausea, bloating and fullness     Ginger has been helpful for some patients.  Gingerroot capsules can be purchased over-the-counter.    Directions: Ginger root 500 mg (or 550 mg) capsules, take 1 to 2 capsules 3 times daily before meals, max 6 capsules per day.      Blood work today to reassess liver enzymes    Once we receive these results, our office will contact you to discuss updating your treatment plan as indicated     We recommend starting/continuing a nightly over the counter Magnesium supplement to help with bowel regularity     How Does Magnesium Help with Constipation?  Water Absorption:   Magnesium helps your small intestine absorb water, which increases the fluid in your intestines  Stimulating Bowel Movements:   This additional fluid helps soften stools and stimulates the pulsing movement of the intestines (peristalsis), making it easier to have bowel movements    Types of Magnesium for Constipation  Magnesium Citrate:   Often used as a laxative, it is  available in liquid and pill form and works quickly.  Liquid form is most commonly used in bowel preparation.  Tablet formulation can be used long-term.  Magnesium Hydroxide:   Commonly known as milk of magnesia, it is available in liquid or tablet form  Magnesium Oxide:   Available in tablet form, it is less commonly used for constipation but can be effective    Dosage and Administration  Dosage:   Follow the dosage instructions on the product label.  The typical dose for constipation relief is 240-480 mg per day  Timing:   Take magnesium supplements with a full glass of water  Consistency:   Take it at the same time each day (preferably in the evening) to help remember your dose      For Constipation:    Goal of constipation regimen is to produce at least 3 complete bowel movements per week   Recommend starting a soluble fiber regimen that includes psyllium such as Metamucil.    This helps to keep stool soft and formed and easy transit throughout the colon to produce regular and complete bowel movements.  Consume at least 20 to 30 g of dietary fiber per day  Research has proven that consuming 5 prunes or 2 kiwi fruit per day can also aid in reducing constipation.  When starting fiber regimen recommend starting with a low-dose and gradually increasing by 1 tablespoon every 7 days as tolerated.    This will help your body acclimate to the higher fiber diet and reduce risk of unwanted side effects that include bloating, gas, abdominal fullness, and cramping  For example: Begin taking 1 tablespoon daily for at least 7 days, if this is not successful in producing regular bowel movements can begin taking 1 tablespoons twice a day, and finally if this is not successful after taking 2 tablespoons for 7 days, can further increase to maximum recommended dosing of 1 tablespoon 3 times per day.  If you choose capsule formulation:  Begin taking 3 capsules daily in the morning with at least 8 ounces of water, then increase by 1  capsule every 7 days until stool becomes soft and bowel movements are complete and sensation.  Do not exceed maximum package dosing  It is important to consume increased amounts of fluid throughout the day to help improve the effectiveness of the fiber supplementation  Avoid gummy formulations of fiber as this can further increase your risk of the above adverse effects due to artificial sweeteners in the Gummies.

## 2025-02-27 NOTE — PROGRESS NOTES
Patient or patient representative verbalized consent for the use of Ambient Listening during the visit with  GEORGINA Irwin for chart documentation. 2/27/2025  09:30 EST    Chief Complaint   Patient presents with    Nausea    Rectal Bleeding    Abdominal Pain    Rectal Pain         Patient is a 44 y.o. who presents to the office as a new patient for further evaluation of rectal bleeding and generalized abdominal pain after referral received from Dr. 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  Past Surgical History   No significant past GI surgical history    Social History  Denies use of tobacco, alcohol, or illicit drug use    Family History  No known family history of colon cancer, colon polyps, or IBD    History of Present Illness  The patient presents for evaluation of elevated liver enzymes, hematochezia, and nausea.    Elevated Liver Enzymes-  -states that this was the first time he was told of elevated liver enzymes.   -The patient denies any history of alcohol consumption, intravenous drug use, or exposure to high-risk work environments. They are uncertain if liver enzymes were previously tested. The patient reports experiencing sharp, burning pains and started taking naproxen 3.5-4 weeks ago, with a dosage of 3-4 doses daily for several weeks.    Hematochezia  - Diagnosed with an anal fissure and irritable bowel syndrome (IBS)  - Intermittent hematochezia for several years, with increased frequency this year  - Often occurs upon waking  - Bowel movements range from 1-2 to 5-6 per day  - Occasional sensations of incomplete evacuation  - Taking Citrucel daily for 20 years, recently increased dosage to 2 servings  - Bowel movements remain inconsistent  - Diet largely unchanged, with a tendency to consume junk food in the late evenings  - Inconsistent fluid intake  - Reported rectal bleeding on December 15, 2024,  characterized by dark red blood and a bright red ring  - Similar episode occurred two weeks prior    Nausea  - Began experiencing brief, spontaneous episodes of nausea in October 2024  - Episodes last approximately 10 seconds  - Occur sporadically over several months  - Prilosec provided temporary relief after three cycles    SOCIAL HISTORY  - Do not consume alcohol  -  at [email or web address]    FAMILY HISTORY  - Cousin had a section of their colon removed    MEDICATIONS  - Current: Citrucel, naproxen, Prilosec    Medications    Current Outpatient Medications:     alfuzosin (UROXATRAL) 10 MG 24 hr tablet, Take 1 tablet by mouth Daily., Disp: , Rfl:     carvedilol (COREG) 6.25 MG tablet, Take 1 tablet by mouth 2 (Two) Times a Day With Meals. Able to take extra one time does per day if worsening palpitations, Disp: 60 tablet, Rfl: 11    levothyroxine (SYNTHROID, LEVOTHROID) 50 MCG tablet, Take 1 tablet by mouth Daily., Disp: , Rfl:     multivitamin with minerals tablet tablet, Take 1 tablet by mouth Daily., Disp: , Rfl:     ondansetron ODT (ZOFRAN-ODT) 4 MG disintegrating tablet, Place 1 tablet on the tongue Every 8 (Eight) Hours As Needed for Nausea or Vomiting., Disp: 15 tablet, Rfl: 0    Probiotic Product (PRO-BIOTIC BLEND PO), Take  by mouth., Disp: , Rfl:   Result Review :    Common labs          2/19/2025    07:34   Common Labs   Glucose 100    BUN 15    Creatinine 0.91    Sodium 138    Potassium 4.0    Chloride 104    Calcium 9.1    Albumin 4.2    Total Bilirubin 0.9    Alkaline Phosphatase 44    AST (SGOT) 46    ALT (SGPT) 86    WBC 4.29    Hemoglobin 15.7    Hematocrit 44.1    Platelets 174        ED with Augustus Ramirez MD (02/19/2025)   CT Abdomen Pelvis With Contrast (02/19/2025 09:24)   Unremarkable liver, gallbladder, adrenal glands, spleen, and pancreas  Unremarkable stomach, small bowel and colon without visible cause to elevation in liver enzymes   Office Visit with Zana Rendon,  "GERARDO (07/18/2024)   Vital Signs:   /88 (BP Location: Left arm, Patient Position: Sitting, Cuff Size: Large Adult)   Ht 186.7 cm (73.5\")   Wt 105 kg (230 lb 6.4 oz)   BMI 29.98 kg/m²     Body mass index is 29.98 kg/m².      Physical Exam  Constitutional:       General: He is not in acute distress.     Appearance: Normal appearance. He is not ill-appearing.   HENT:      Head: Normocephalic.   Eyes:      General: No scleral icterus.  Pulmonary:      Effort: No respiratory distress.   Abdominal:      General: Abdomen is flat. Bowel sounds are normal. There is no distension.      Palpations: Abdomen is soft. There is no mass.      Tenderness: There is abdominal tenderness. There is no guarding or rebound.      Hernia: No hernia is present.      Comments: Generalized abdominal tenderness to palpation   Skin:     General: Skin is warm and dry.   Neurological:      Mental Status: He is alert.      Gait: Gait normal.   Psychiatric:         Mood and Affect: Mood normal.       Assessment and Plan    Diagnoses and all orders for this visit:    1. Rectal bleeding (Primary)    2. Abdominal pain, unspecified abdominal location    3. Nausea    4. Chronic idiopathic constipation    5. Elevated liver enzymes  -     Hepatic Function Panel       Assessment & Plan  Elevated liver enzymes  - CT scan showed no structural abnormalities  - Previous liver enzyme levels normal on 02/20/2023  - Naproxen may contribute to elevation  - Re-evaluate liver enzymes today  - Advise abstaining from naproxen and other anti-inflammatories  - If elevated, initiate comprehensive workup    Blood in stool  - Possible irritation from naproxen or constipation  - Continue Citrucel  - Consider magnesium supplement at night  - Transition to Metamucil or psyllium  - Schedule upper and lower endoscopy  - Prep with MiraLAX and Dulcolax    Nausea  - Possible stomach irritation or constipation  - Advise Gaviscon for upper abdominal pain  - Tania root " capsules for nausea    Constipation  - Possible incomplete bowel emptying  -Recommend stopping Citrucel and begin Transition to Metamucil or psyllium in hopes of improving bowel completeness and regularity  -Start magnesium supplement at night    Follow-up  - Follow-up in 4-6 weeks post-endoscopy to discuss results    Patient is agreeable to the outlined above treatment plan.  Verbalizes understanding and will contact office for any new or worsening concerns.  All questions answered and support provided.  Patient Instructions   Schedule EGD and colonoscopy, orders placed     Ways to help reduce heartburn symptoms:    Avoid eating late night snacks within 3 hours of going to bed  If you have increased abdominal body weight, lifestyle changes to improve weight can help with heartburn symtoms  If you use tobacco products, we recommend that you stop smoking including e-cigarettes  Research has proven that people with heartburn who use tobacco can reduce heartburn symptoms by 44% after they stop smoking.   Sleep on your left side  Sleeping with your head/upper body elevated with a wedge pillow or with the head of the bed inclined   Avoid foods that can trigger symptoms which may include:  citrus fruits  spicy foods,  Tomatoes and Red sauces   Chocolate  coffee/tea  caffeinated or carbonated beverages  Alcohol  High fat foods  peppermint    We also recommend considering over-the-counter liquid Gaviscon per package instructions to take as needed for heartburn symptoms.    For nausea, bloating and fullness     Ginger has been helpful for some patients.  Gingerroot capsules can be purchased over-the-counter.    Directions: Ginger root 500 mg (or 550 mg) capsules, take 1 to 2 capsules 3 times daily before meals, max 6 capsules per day.      Blood work today to reassess liver enzymes    Once we receive these results, our office will contact you to discuss updating your treatment plan as indicated     We recommend  starting/continuing a nightly over the counter Magnesium supplement to help with bowel regularity     How Does Magnesium Help with Constipation?  Water Absorption:   Magnesium helps your small intestine absorb water, which increases the fluid in your intestines  Stimulating Bowel Movements:   This additional fluid helps soften stools and stimulates the pulsing movement of the intestines (peristalsis), making it easier to have bowel movements    Types of Magnesium for Constipation  Magnesium Citrate:   Often used as a laxative, it is available in liquid and pill form and works quickly.  Liquid form is most commonly used in bowel preparation.  Tablet formulation can be used long-term.  Magnesium Hydroxide:   Commonly known as milk of magnesia, it is available in liquid or tablet form  Magnesium Oxide:   Available in tablet form, it is less commonly used for constipation but can be effective    Dosage and Administration  Dosage:   Follow the dosage instructions on the product label.  The typical dose for constipation relief is 240-480 mg per day  Timing:   Take magnesium supplements with a full glass of water  Consistency:   Take it at the same time each day (preferably in the evening) to help remember your dose      For Constipation:    Goal of constipation regimen is to produce at least 3 complete bowel movements per week   Recommend starting a soluble fiber regimen that includes psyllium such as Metamucil.    This helps to keep stool soft and formed and easy transit throughout the colon to produce regular and complete bowel movements.  Consume at least 20 to 30 g of dietary fiber per day  Research has proven that consuming 5 prunes or 2 kiwi fruit per day can also aid in reducing constipation.  When starting fiber regimen recommend starting with a low-dose and gradually increasing by 1 tablespoon every 7 days as tolerated.    This will help your body acclimate to the higher fiber diet and reduce risk of unwanted  side effects that include bloating, gas, abdominal fullness, and cramping  For example: Begin taking 1 tablespoon daily for at least 7 days, if this is not successful in producing regular bowel movements can begin taking 1 tablespoons twice a day, and finally if this is not successful after taking 2 tablespoons for 7 days, can further increase to maximum recommended dosing of 1 tablespoon 3 times per day.  If you choose capsule formulation:  Begin taking 3 capsules daily in the morning with at least 8 ounces of water, then increase by 1 capsule every 7 days until stool becomes soft and bowel movements are complete and sensation.  Do not exceed maximum package dosing  It is important to consume increased amounts of fluid throughout the day to help improve the effectiveness of the fiber supplementation  Avoid gummy formulations of fiber as this can further increase your risk of the above adverse effects due to artificial sweeteners in the Gummies.     EMR Dragon/Transcription Disclaimer:  This document has been Dictated utilizing Dragon dictation.     Elli Navarro, MSN, APRN, FNP-C   [facility]  Gastroenterology   [address] [facility] [place] [address]  [phone] office  [phone] fax

## 2025-03-05 ENCOUNTER — ANESTHESIA EVENT (OUTPATIENT)
Dept: PERIOP | Facility: HOSPITAL | Age: 45
End: 2025-03-05
Payer: COMMERCIAL

## 2025-03-06 ENCOUNTER — HOSPITAL ENCOUNTER (OUTPATIENT)
Facility: HOSPITAL | Age: 45
Setting detail: HOSPITAL OUTPATIENT SURGERY
Discharge: HOME | End: 2025-03-06
Attending: STUDENT IN AN ORGANIZED HEALTH CARE EDUCATION/TRAINING PROGRAM | Admitting: STUDENT IN AN ORGANIZED HEALTH CARE EDUCATION/TRAINING PROGRAM
Payer: COMMERCIAL

## 2025-03-06 ENCOUNTER — ANESTHESIA (OUTPATIENT)
Dept: PERIOP | Facility: HOSPITAL | Age: 45
End: 2025-03-06
Payer: COMMERCIAL

## 2025-03-06 VITALS
DIASTOLIC BLOOD PRESSURE: 89 MMHG | HEIGHT: 74 IN | OXYGEN SATURATION: 96 % | SYSTOLIC BLOOD PRESSURE: 132 MMHG | TEMPERATURE: 97.7 F | BODY MASS INDEX: 28.7 KG/M2 | HEART RATE: 67 BPM | RESPIRATION RATE: 13 BRPM | WEIGHT: 223.6 LBS

## 2025-03-06 DIAGNOSIS — R10.9 ABDOMINAL PAIN, UNSPECIFIED ABDOMINAL LOCATION: ICD-10-CM

## 2025-03-06 DIAGNOSIS — K59.04 CHRONIC IDIOPATHIC CONSTIPATION: ICD-10-CM

## 2025-03-06 DIAGNOSIS — R11.0 NAUSEA: ICD-10-CM

## 2025-03-06 DIAGNOSIS — K62.5 RECTAL BLEEDING: ICD-10-CM

## 2025-03-06 PROCEDURE — 25810000003 LACTATED RINGERS PER 1000 ML

## 2025-03-06 PROCEDURE — 25010000002 PROPOFOL 200 MG/20ML EMULSION: Performed by: NURSE ANESTHETIST, CERTIFIED REGISTERED

## 2025-03-06 PROCEDURE — 88305 TISSUE EXAM BY PATHOLOGIST: CPT | Performed by: STUDENT IN AN ORGANIZED HEALTH CARE EDUCATION/TRAINING PROGRAM

## 2025-03-06 PROCEDURE — 43239 EGD BIOPSY SINGLE/MULTIPLE: CPT | Performed by: STUDENT IN AN ORGANIZED HEALTH CARE EDUCATION/TRAINING PROGRAM

## 2025-03-06 PROCEDURE — 25010000002 LIDOCAINE 2% SOLUTION: Performed by: NURSE ANESTHETIST, CERTIFIED REGISTERED

## 2025-03-06 PROCEDURE — 45378 DIAGNOSTIC COLONOSCOPY: CPT | Performed by: STUDENT IN AN ORGANIZED HEALTH CARE EDUCATION/TRAINING PROGRAM

## 2025-03-06 RX ORDER — LIDOCAINE HYDROCHLORIDE 20 MG/ML
INJECTION, SOLUTION INFILTRATION; PERINEURAL AS NEEDED
Status: DISCONTINUED | OUTPATIENT
Start: 2025-03-06 | End: 2025-03-06 | Stop reason: SURG

## 2025-03-06 RX ORDER — OMEPRAZOLE 40 MG/1
40 CAPSULE, DELAYED RELEASE ORAL
Qty: 90 CAPSULE | Refills: 3 | Status: SHIPPED | OUTPATIENT
Start: 2025-03-06

## 2025-03-06 RX ORDER — SODIUM CHLORIDE, SODIUM LACTATE, POTASSIUM CHLORIDE, CALCIUM CHLORIDE 600; 310; 30; 20 MG/100ML; MG/100ML; MG/100ML; MG/100ML
100 INJECTION, SOLUTION INTRAVENOUS ONCE
Status: DISCONTINUED | OUTPATIENT
Start: 2025-03-06 | End: 2025-03-06 | Stop reason: HOSPADM

## 2025-03-06 RX ORDER — SODIUM CHLORIDE 0.9 % (FLUSH) 0.9 %
3 SYRINGE (ML) INJECTION EVERY 12 HOURS SCHEDULED
Status: DISCONTINUED | OUTPATIENT
Start: 2025-03-06 | End: 2025-03-06 | Stop reason: HOSPADM

## 2025-03-06 RX ORDER — ONDANSETRON 2 MG/ML
4 INJECTION INTRAMUSCULAR; INTRAVENOUS ONCE AS NEEDED
Status: DISCONTINUED | OUTPATIENT
Start: 2025-03-06 | End: 2025-03-06 | Stop reason: HOSPADM

## 2025-03-06 RX ORDER — LIDOCAINE HYDROCHLORIDE 10 MG/ML
0.5 INJECTION, SOLUTION EPIDURAL; INFILTRATION; INTRACAUDAL; PERINEURAL ONCE AS NEEDED
Status: DISCONTINUED | OUTPATIENT
Start: 2025-03-06 | End: 2025-03-06 | Stop reason: HOSPADM

## 2025-03-06 RX ORDER — SODIUM CHLORIDE 0.9 % (FLUSH) 0.9 %
10 SYRINGE (ML) INJECTION EVERY 12 HOURS SCHEDULED
Status: DISCONTINUED | OUTPATIENT
Start: 2025-03-06 | End: 2025-03-06 | Stop reason: HOSPADM

## 2025-03-06 RX ORDER — SODIUM CHLORIDE 0.9 % (FLUSH) 0.9 %
10 SYRINGE (ML) INJECTION AS NEEDED
Status: DISCONTINUED | OUTPATIENT
Start: 2025-03-06 | End: 2025-03-06 | Stop reason: HOSPADM

## 2025-03-06 RX ORDER — SODIUM CHLORIDE 9 MG/ML
40 INJECTION, SOLUTION INTRAVENOUS AS NEEDED
Status: DISCONTINUED | OUTPATIENT
Start: 2025-03-06 | End: 2025-03-06 | Stop reason: HOSPADM

## 2025-03-06 RX ORDER — SODIUM CHLORIDE, SODIUM LACTATE, POTASSIUM CHLORIDE, CALCIUM CHLORIDE 600; 310; 30; 20 MG/100ML; MG/100ML; MG/100ML; MG/100ML
9 INJECTION, SOLUTION INTRAVENOUS CONTINUOUS
Status: DISCONTINUED | OUTPATIENT
Start: 2025-03-06 | End: 2025-03-06 | Stop reason: HOSPADM

## 2025-03-06 RX ORDER — SODIUM CHLORIDE, SODIUM LACTATE, POTASSIUM CHLORIDE, CALCIUM CHLORIDE 600; 310; 30; 20 MG/100ML; MG/100ML; MG/100ML; MG/100ML
30 INJECTION, SOLUTION INTRAVENOUS CONTINUOUS PRN
Status: DISCONTINUED | OUTPATIENT
Start: 2025-03-06 | End: 2025-03-06 | Stop reason: HOSPADM

## 2025-03-06 RX ORDER — PROPOFOL 10 MG/ML
INJECTION, EMULSION INTRAVENOUS AS NEEDED
Status: DISCONTINUED | OUTPATIENT
Start: 2025-03-06 | End: 2025-03-06 | Stop reason: SURG

## 2025-03-06 RX ADMIN — PROPOFOL 50 MG: 10 INJECTION, EMULSION INTRAVENOUS at 11:30

## 2025-03-06 RX ADMIN — PROPOFOL 100 MG: 10 INJECTION, EMULSION INTRAVENOUS at 11:02

## 2025-03-06 RX ADMIN — LIDOCAINE HYDROCHLORIDE 100 MG: 20 INJECTION, SOLUTION INFILTRATION; PERINEURAL at 11:02

## 2025-03-06 RX ADMIN — PROPOFOL 50 MG: 10 INJECTION, EMULSION INTRAVENOUS at 11:07

## 2025-03-06 RX ADMIN — PROPOFOL 50 MG: 10 INJECTION, EMULSION INTRAVENOUS at 11:14

## 2025-03-06 RX ADMIN — SODIUM CHLORIDE, POTASSIUM CHLORIDE, SODIUM LACTATE AND CALCIUM CHLORIDE: 600; 310; 30; 20 INJECTION, SOLUTION INTRAVENOUS at 10:54

## 2025-03-06 RX ADMIN — PROPOFOL 50 MG: 10 INJECTION, EMULSION INTRAVENOUS at 11:19

## 2025-03-06 RX ADMIN — PROPOFOL 50 MG: 10 INJECTION, EMULSION INTRAVENOUS at 11:11

## 2025-03-06 RX ADMIN — PROPOFOL 50 MG: 10 INJECTION, EMULSION INTRAVENOUS at 11:24

## 2025-03-06 NOTE — H&P
Patient Care Team:  Alden Amin MD as PCP - General (Family Medicine)    CHIEF COMPLAINT:  nausea and hematochezia    HISTORY OF PRESENT ILLNESS:  EGD for nausea.   C/s for hematochezia.     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 No pertinent family history.  Social History     Tobacco Use    Smoking status: Never    Smokeless tobacco: Never   Vaping Use    Vaping status: Never Used   Substance Use Topics    Alcohol use: Never    Drug use: Never     Medications Prior to Admission   Medication Sig Dispense Refill Last Dose/Taking    alfuzosin (UROXATRAL) 10 MG 24 hr tablet Take 1 tablet by mouth Daily.   3/5/2025 at  8:00 PM    carvedilol (COREG) 6.25 MG tablet Take 1 tablet by mouth 2 (Two) Times a Day With Meals. Able to take extra one time does per day if worsening palpitations 60 tablet 11 3/6/2025    levothyroxine (SYNTHROID, LEVOTHROID) 50 MCG tablet Take 1 tablet by mouth Daily.   3/6/2025 at  8:00 AM    multivitamin with minerals tablet tablet Take 1 tablet by mouth Daily.   Past Month    ondansetron ODT (ZOFRAN-ODT) 4 MG disintegrating tablet Place 1 tablet on the tongue Every 8 (Eight) Hours As Needed for Nausea or Vomiting. 15 tablet 0 More than a month    Probiotic Product (PRO-BIOTIC BLEND PO) Take  by mouth.   More than a month     Allergies:  Levaquin [levofloxacin]    REVIEW OF SYSTEMS:  Please see the above history of present illness for pertinent positives and negatives.  The remainder of the patient's systems have been reviewed and are negative.     Vital Signs  Temp:  [97.7 °F (36.5 °C)] 97.7 °F (36.5 °C)  Heart Rate:  [75] 75  Resp:  [11] 11  BP: (130)/(90) 130/90    Flowsheet Rows      Flowsheet Row First Filed Value   Admission Height 186.7 cm  "(73.5\") Documented at 03/06/2025 1035   Admission Weight 101 kg (223 lb 9.6 oz) Documented at 03/06/2025 1035             Physical Exam:  Physical Exam   Constitutional: Patient appears well-developed and well-nourished and in no acute distress   HEENT:   Head: Normocephalic and atraumatic.   Eyes:  Pupils are equal, round, and reactive to light. EOM are intact. Sclerae are anicteric and non-injected.  Mouth and Throat: Patient has moist mucous membranes. Oropharynx is clear of any erythema or exudate.     Neck: Neck supple. No JVD present. No thyromegaly present. No lymphadenopathy present.  Cardiovascular: Regular rate, regular rhythm, S1 normal and S2 normal.  Exam reveals no gallop and no friction rub.  No murmur heard.  Pulmonary/Chest: Lungs are clear to auscultation bilaterally. No respiratory distress. No wheezes. No rhonchi. No rales.   Abdominal: Soft. Bowel sounds are normal. No distension and no mass. There is no hepatosplenomegaly. There is no tenderness.   Musculoskeletal: Normal Muscle tone  Extremities: No edema. Pulses are palpable in all 4 extremities.  Neurological: Patient is alert and oriented to person, place, and time. Cranial nerves II-XII are grossly intact with no focal deficits.  Skin: Skin is warm. No rash noted. Nails show no clubbing.  No cyanosis or erythema.    Debilities/Disabilities Identified: None  Emotional Behavior: Appropriate     Results Review:   I reviewed the patient's new clinical results.    Lab Results (most recent)       None            Imaging Results (Most Recent)       None          reviewed    ECG/EMG Results (most recent)       None          reviewed    Assessment & Plan   Nausea and hematochezia /  EGD and colonoscopy      I discussed the patient's findings and my recommendations with patient.     Irvin Cantu MD  03/06/25  11:02 EST    Time: 10 min prior to procedure.     "

## 2025-03-06 NOTE — ANESTHESIA POSTPROCEDURE EVALUATION
Patient: Gt Diehl    Procedure Summary       Date: 03/06/25 Room / Location: [facility] ENDOSCOPY 2 /  [facility]    Anesthesia Start: 1055 Anesthesia Stop: 1133    Procedures:       ESOPHAGOGASTRODUODENOSCOPY WITH BIOPSY      COLONOSCOPY FOR SCREENING Diagnosis:       Rectal bleeding      Abdominal pain, unspecified abdominal location      Nausea      Chronic idiopathic constipation      (Rectal bleeding [K62.5])      (Abdominal pain, unspecified abdominal location [R10.9])      (Nausea [R11.0])      (Chronic idiopathic constipation [K59.04])    Surgeons: Irvin Cantu MD Provider: Juliano Maurer CRNA    Anesthesia Type: MAC ASA Status: 2            Anesthesia Type: MAC    Vitals  Vitals Value Taken Time   /89 03/06/25 1215   Temp     Pulse 64 03/06/25 1217   Resp 13 03/06/25 1140   SpO2 95 % 03/06/25 1217   Vitals shown include unfiled device data.        Post Anesthesia Care and Evaluation    Patient location during evaluation: PHASE II  Patient participation: complete - patient participated  Level of consciousness: awake and alert  Pain score: 0  Pain management: satisfactory to patient    Airway patency: patent  Anesthetic complications: No anesthetic complications  PONV Status: none  Cardiovascular status: acceptable  Respiratory status: acceptable  Hydration status: acceptable    Comments: Late entry, patient seen prior to discharge.

## 2025-03-06 NOTE — ANESTHESIA PREPROCEDURE EVALUATION
Anesthesia Evaluation     Patient summary reviewed and Nursing notes reviewed   no history of anesthetic complications:   NPO Solid Status: > 8 hours  NPO Liquid Status: > 2 hours           Airway   Mallampati: III  TM distance: >3 FB  Neck ROM: full  Possible difficult intubation  Dental - normal exam     Pulmonary - negative pulmonary ROS and normal exam   Cardiovascular - normal exam    Rhythm: regular  Rate: normal    (+) hypertension    ROS comment: Myocarditis 6/2023, on coreg    Neuro/Psych  (+) headaches (migraines), dizziness/light headedness (prior to carvedilol)  GI/Hepatic/Renal/Endo    (+) GERD poorly controlled, thyroid problem hypothyroidism    Musculoskeletal     (+) back pain (injury 20 years ago, sees chiro)  Abdominal  - normal exam   Substance History - negative use     OB/GYN          Other      Arthritis: right knee.  ROS/Med Hx Other: Sips water 0800, carvedilol this am              Anesthesia Plan    ASA 2     MAC     intravenous induction     Anesthetic plan, risks, benefits, and alternatives have been provided, discussed and informed consent has been obtained with: patient and spouse/significant other.  Pre-procedure education provided  Use of blood products discussed with patient and spouse/significant other  Consented to blood products.    Plan discussed with CRNA.    CODE STATUS:

## 2025-03-10 LAB
CYTO UR: NORMAL
LAB AP CASE REPORT: NORMAL
PATH REPORT.FINAL DX SPEC: NORMAL
PATH REPORT.GROSS SPEC: NORMAL

## 2025-04-08 ENCOUNTER — OFFICE VISIT (OUTPATIENT)
Dept: CARDIOLOGY | Age: 45
End: 2025-04-08
Payer: COMMERCIAL

## 2025-04-08 VITALS
WEIGHT: 239 LBS | SYSTOLIC BLOOD PRESSURE: 130 MMHG | DIASTOLIC BLOOD PRESSURE: 80 MMHG | BODY MASS INDEX: 30.67 KG/M2 | HEIGHT: 74 IN | OXYGEN SATURATION: 97 %

## 2025-04-08 DIAGNOSIS — R00.2 PALPITATIONS: Primary | ICD-10-CM

## 2025-04-08 DIAGNOSIS — I42.8 NICM (NONISCHEMIC CARDIOMYOPATHY): ICD-10-CM

## 2025-04-08 DIAGNOSIS — I50.32 HEART FAILURE WITH RECOVERED EJECTION FRACTION (HFRECEF): ICD-10-CM

## 2025-04-08 PROCEDURE — 99214 OFFICE O/P EST MOD 30 MIN: CPT | Performed by: STUDENT IN AN ORGANIZED HEALTH CARE EDUCATION/TRAINING PROGRAM

## 2025-04-08 NOTE — PROGRESS NOTES
[facility]    Subjective:     Encounter Date:04/08/25      Patient ID: Gt Diehl is a 44 y.o. male.    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    Palpitations  History of Present Illness    Mr. Diehl is a pleasant 44 y.o. gentleman past medical history hypertension,  hiatal hernia, who presents for further evaluation of chest pain and palpitations.      He was initially evaluated for these palpitations..  He presented the emergency department in 2023 for further evaluation, and a high sensitive troponin was undetectable.  Work-up was benign.  He was having palpitations were worsening at that time, and a 24-hour Holter was fitted.  He had numerous episodes of palpitations that occurred while wearing the monitor, however tracings did not demonstrate significant arrhythmias to explain his symptoms, except for occasional PVCs.  He did go for a 5K run while wearing the monitor, and sinus tachycardia was observed without abnormalities.     He underwent an echocardiogram which revealed a mildly reduced EF, and he was started on carvedilol due to this.  Subsequently underwent a CMR in 2020 for which revealed no evidence of LGE and a low normal EF of 51%.  His symptoms were thought related to a significant COVID-19 infection with probable myocarditis that occurred in 2021 with residual symptoms since that time.    Repeat echocardiogram was completed in late 2024 which showed normalization of his EF.  He reports compliance with his carvedilol and his blood pressure is great and he otherwise feels well.  He did have a intermittent palpitation complaint but nothing of significance  Previous cardiac testing:  Treadmill stress test June 2023 DTS +10    No ECG evidence of myocardial ischemia.    Negative clinical evidence of myocardial ischemia.    Findings consistent  with a normal ECG stress test.    Echocardiogram June 2023:    Left ventricular systolic function is mildly decreased. Left ventricular ejection fraction appears to be 46 - 50%. Abnormal global longitudinal LV strain (GLS) = -15.1%. Left ventricle strain data was not reviewed by the physician. Normal left ventricular cavity size and wall thickness noted. All left ventricular wall segments contract normally. Left ventricular diastolic function was normal.    The pulmonic valve is not well-visualized. The peak velocity of 2.2 m/s (maximum and mean pressure gradient of 19 and 10 mmHg, respectively) is suggestive of mild pulmonary stenosis. However, nonvisualization of the valve means that it cannot be determined if it is valvular, subvalvular, or supravalvular.    Trace to mild mitral valve regurgitation is present. No significant mitral valve stenosis is present. There is very mild myxomatous change of the mitral valve.    Cardiac MRIJuly 7, 2023  1. The left ventricle is borderline dilated.  There is low normal systolic function with an ejection fraction of 51%.  2. The right ventricle is mildly dilated with normal systolic function.  Ejection fraction 54%.  3.  Normal perfusion of the myocardium.  4.  There is no delayed enhancement.     TTE, July 2024: EF 50%, no significant pulmonic stenosis.    The following portions of the patient's history were reviewed and updated as appropriate: allergies, current medications, past family history, past medical history, past social history, past surgical history and problem list.    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     Prostatitis, chronic        Past Surgical History:   Procedure Laterality Date    COLONOSCOPY N/A 3/6/2025    Procedure: COLONOSCOPY FOR SCREENING;  Surgeon: Irvin Cantu MD;  Location: [facility];  Service: Gastroenterology;  Laterality: N/A;   "internal hemorrhoids    CYST REMOVAL      right wrist    ENDOSCOPY N/A 3/6/2025    Procedure: ESOPHAGOGASTRODUODENOSCOPY WITH BIOPSY;  Surgeon: Irvin Cantu MD;  Location: [facility];  Service: Gastroenterology;  Laterality: N/A;  gastritis; gastric bx to r/o h pylori         Procedures       Objective:     Vitals:    04/08/25 1023   BP: 130/80   BP Location: Left arm   Patient Position: Sitting   Cuff Size: Adult   SpO2: 97%   Weight: 108 kg (239 lb)   Height: 186.7 cm (73.5\")         Constitutional:       Appearance: Healthy appearance. Not in distress.   Neck:      Vascular: JVD normal.   Pulmonary:      Effort: Pulmonary effort is normal.      Breath sounds: Normal breath sounds.   Cardiovascular:      PMI at left midclavicular line. Normal rate. Regular rhythm. Normal S2.       Murmurs: There is no murmur.   Pulses:     Intact distal pulses.   Edema:     Peripheral edema absent.   Skin:     General: Skin is warm and dry.   Neurological:      General: No focal deficit present.      Mental Status: Alert, oriented to person, place, and time and oriented to person, place and time.   Psychiatric:         Mood and Affect: Mood and affect normal.         Lab Review:       BUN   Date Value Ref Range Status   02/19/2025 15 6 - 20 mg/dL Final     Creatinine   Date Value Ref Range Status   02/19/2025 0.91 0.76 - 1.27 mg/dL Final   07/07/2023 0.80 0.60 - 1.30 mg/dL Final     Comment:     Serial Number: [id number]:  [id number]     Potassium   Date Value Ref Range Status   02/19/2025 4.0 3.5 - 5.2 mmol/L Final     ALT (SGPT)   Date Value Ref Range Status   02/27/2025 35 1 - 41 U/L Final     AST (SGOT)   Date Value Ref Range Status   02/27/2025 27 1 - 40 U/L Final     24-hour Holter monitor May 15, 2023:       Interpretation Summary         A normal monitor study.    There were 26 patient triggered events.  Of those, the majority of fluttering events correlate with sinus rhythm.  Only a few correlated with a single PVC.  " There were no abnormal heart rhythms detected to explain patient's symptoms.  Sinus tachycardia with rate of 150 bpm was observed during the monitoring, per journal this correlates with a 5K run.  Normal monitor.      Performed        Assessment:          Diagnosis Plan   1. Palpitations        2. NICM (nonischemic cardiomyopathy)        3. 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  Nothing sustaining.  We discussed obtaining the ECG feature on his Apple Watch.  I do suspect that the cardiomyopathy he had a was related to a prior residual COVID-19 related myocarditis episode.    Given this, we will repeat a 10-day Holter to look for any significant arrhythmias   Continue current dose of carvedilol  Cardiomyopathy, nonischemic  EF recovered to 50% with most recent echo   CMR without evidence of LGE 2023  A treadmill stress test had no evidence of ischemia with a good DTS  I favor just continuing on carvedilol for now given concomitant hypertension  Hypertension.  Controlled on current carvedilol dosing.       RTC 1 year.  Patient was reassured looks like his cardiac function is recovered, he has no residual scarring or damage from his cardiac MRI findings, and his palpitations are likely just benign PVCs.    Franky Davis MD  [facility]  04/08/25  16:12 EDT       Current Outpatient Medications:     alfuzosin (UROXATRAL) 10 MG 24 hr tablet, Take 1 tablet by mouth Daily., Disp: , Rfl:     carvedilol (COREG) 6.25 MG tablet, Take 1 tablet by mouth 2 (Two) Times a Day With Meals. Able to take extra one time does per day if worsening palpitations, Disp: 60 tablet, Rfl: 11    levothyroxine (SYNTHROID, LEVOTHROID) 50 MCG tablet, Take 1 tablet by mouth Daily.,  Disp: , Rfl:     linaclotide (Linzess) 145 MCG capsule capsule, Take 1 capsule by mouth Every Morning Before Breakfast., Disp: 30 capsule, Rfl: 11    multivitamin with minerals tablet tablet, Take 1 tablet by mouth Daily., Disp: , Rfl:     omeprazole (priLOSEC) 40 MG capsule, Take 1 capsule by mouth Every Morning Before Breakfast., Disp: 90 capsule, Rfl: 3    ondansetron ODT (ZOFRAN-ODT) 4 MG disintegrating tablet, Place 1 tablet on the tongue Every 8 (Eight) Hours As Needed for Nausea or Vomiting., Disp: 15 tablet, Rfl: 0    Probiotic Product (PRO-BIOTIC BLEND PO), Take  by mouth. (Patient not taking: Reported on 4/8/2025), Disp: , Rfl:          Return in about 1 year (around 4/8/2026).      Part of this note may be an electronic transcription/translation of spoken language to printed text using the Dragon Dictation System.

## 2025-05-05 DIAGNOSIS — R00.2 PALPITATIONS: ICD-10-CM

## 2025-05-05 RX ORDER — CARVEDILOL 6.25 MG/1
TABLET ORAL
Qty: 60 TABLET | Refills: 11 | OUTPATIENT
Start: 2025-05-05

## 2025-05-05 RX ORDER — CARVEDILOL 6.25 MG/1
TABLET ORAL
Qty: 60 TABLET | Refills: 11 | Status: SHIPPED | OUTPATIENT
Start: 2025-05-05

## 2025-05-05 NOTE — TELEPHONE ENCOUNTER
Caller: Gt Diehl    Relationship: Self    Best call back number: [phone]    Requested Prescriptions:   Requested Prescriptions     Pending Prescriptions Disp Refills    carvedilol (COREG) 6.25 MG tablet 60 tablet 11        Pharmacy where request should be sent: [id number]  [place] - [address]  - [phone]  - [phone] FX     Last office visit with prescribing clinician: 4/8/2025   Last telemedicine visit with prescribing clinician: Visit date not found   Next office visit with prescribing clinician: 4/15/2026     Additional details provided by patient: PATIENT WAS DENIED REFILL UNDSURE WHY PLEASE ADVISE.     Does the patient have less than a 3 day supply:  [] Yes  [x] No    Would you like a call back once the refill request has been completed: [x] Yes [] No    If the office needs to give you a call back, can they leave a voicemail: [x] Yes [] No    Lawrence Aparicio Rep   05/05/25 08:42 EDT

## (undated) DEVICE — ADAPT CLN BIOGUARD AIR/H2O DISP

## (undated) DEVICE — KT ORCA ORCAPOD DISP STRL

## (undated) DEVICE — THE BITE BLOCK MAXI, LATEX FREE STRAP IS USED TO PROTECT THE ENDOSCOPE INSERTION TUBE FROM BEING BITTEN BY THE PATIENT.

## (undated) DEVICE — VIAL FORMALIN CAP 10P 40ML

## (undated) DEVICE — FRCP BX RADJAW4 NDL 2.8 240CM LG OG BX40

## (undated) DEVICE — Device

## (undated) DEVICE — SOL IRR H2O BO 1000ML STRL

## (undated) DEVICE — SYR LL W/SCALE/MARK 3ML STRL

## (undated) DEVICE — LINER SURG CANSTR SXN S/RIGD 1500CC

## (undated) DEVICE — BW-412T DISP COMBO CLEANING BRUSH: Brand: SINGLE USE COMBINATION CLEANING BRUSH